# Patient Record
Sex: FEMALE | Race: WHITE | NOT HISPANIC OR LATINO | Employment: UNEMPLOYED | URBAN - METROPOLITAN AREA
[De-identification: names, ages, dates, MRNs, and addresses within clinical notes are randomized per-mention and may not be internally consistent; named-entity substitution may affect disease eponyms.]

---

## 2017-01-11 ENCOUNTER — TELEPHONE (OUTPATIENT)
Dept: INTERNAL MEDICINE | Facility: CLINIC | Age: 42
End: 2017-01-11

## 2017-01-11 NOTE — TELEPHONE ENCOUNTER
----- Message from Eli ALEXANDER MD sent at 1/10/2017  4:54 PM EST -----  Contact: PATIENT  Stay off the medication and continue with healthy diet (lots of vegetables, less processed food)  ----- Message -----     From: Julia ALEXANDER MA     Sent: 1/10/2017   1:29 PM       To: Eli ALEXANDER MD        ----- Message -----     From: Cris Person     Sent: 1/10/2017   1:18 PM       To: Trisha Cleveland MA    PATIENT WAS TAKING METFORMIN FOR HER PCOS ISSUES. METFORMIN WAS MAKING HER SICK WITH DIARRHEA AND HEART RACING ISSUES. SHE TOOK HERSELF OFF THIS MEDICATION AND IS DOING FINE. SHE WANTED TO CHECK WITH YOU TO SEE IF SHE NEEDS TO TRY SOMETHING ELSE OR JUST STAY OFF OF MEDICATION AT THIS POINT SINCE SHE IS DOING OKAY. YOU CAN REACH PATIENT BACK [phone].

## 2017-03-01 ENCOUNTER — OFFICE VISIT (OUTPATIENT)
Dept: ENDOCRINOLOGY | Facility: CLINIC | Age: 42
End: 2017-03-01

## 2017-03-01 VITALS
HEIGHT: 64 IN | DIASTOLIC BLOOD PRESSURE: 74 MMHG | SYSTOLIC BLOOD PRESSURE: 132 MMHG | HEART RATE: 68 BPM | WEIGHT: 293 LBS | OXYGEN SATURATION: 96 % | BODY MASS INDEX: 50.02 KG/M2

## 2017-03-01 DIAGNOSIS — E28.2 PCOS (POLYCYSTIC OVARIAN SYNDROME): ICD-10-CM

## 2017-03-01 DIAGNOSIS — E89.0 POSTABLATIVE HYPOTHYROIDISM: Primary | ICD-10-CM

## 2017-03-01 DIAGNOSIS — E55.9 VITAMIN D DEFICIENCY: ICD-10-CM

## 2017-03-01 LAB
25(OH)D3 SERPL-MCNC: 14.8 NG/ML
ARTICHOKE IGE QN: 119 MG/DL (ref 0–130)
CHOLEST SERPL-MCNC: 184 MG/DL (ref 0–200)
HBA1C MFR BLD: 5.8 % (ref 4.8–5.6)
HDLC SERPL-MCNC: 34 MG/DL (ref 40–60)
T4 FREE SERPL-MCNC: 1.49 NG/DL (ref 0.89–1.76)
TRIGL SERPL-MCNC: 239 MG/DL (ref 0–150)
TSH SERPL DL<=0.05 MIU/L-ACNC: 0.51 MIU/ML (ref 0.35–5.35)

## 2017-03-01 PROCEDURE — 80061 LIPID PANEL: CPT | Performed by: INTERNAL MEDICINE

## 2017-03-01 PROCEDURE — 82306 VITAMIN D 25 HYDROXY: CPT | Performed by: INTERNAL MEDICINE

## 2017-03-01 PROCEDURE — 84439 ASSAY OF FREE THYROXINE: CPT | Performed by: INTERNAL MEDICINE

## 2017-03-01 PROCEDURE — 99213 OFFICE O/P EST LOW 20 MIN: CPT | Performed by: INTERNAL MEDICINE

## 2017-03-01 PROCEDURE — 84443 ASSAY THYROID STIM HORMONE: CPT | Performed by: INTERNAL MEDICINE

## 2017-03-01 PROCEDURE — 83036 HEMOGLOBIN GLYCOSYLATED A1C: CPT | Performed by: INTERNAL MEDICINE

## 2017-04-15 DIAGNOSIS — E89.0 POSTABLATIVE HYPOTHYROIDISM: ICD-10-CM

## 2017-04-17 RX ORDER — LEVOTHYROXINE SODIUM 25 MCG
TABLET ORAL
Qty: 90 TABLET | Refills: 0 | Status: SHIPPED | OUTPATIENT
Start: 2017-04-17 | End: 2017-09-07 | Stop reason: SDUPTHER

## 2017-04-17 RX ORDER — LEVOTHYROXINE SODIUM 200 MCG
TABLET ORAL
Qty: 90 TABLET | Refills: 0 | Status: SHIPPED | OUTPATIENT
Start: 2017-04-17 | End: 2017-07-30 | Stop reason: SDUPTHER

## 2017-04-17 RX ORDER — LEVOTHYROXINE SODIUM 50 MCG
TABLET ORAL
Qty: 90 TABLET | Refills: 0 | Status: SHIPPED | OUTPATIENT
Start: 2017-04-17 | End: 2017-09-07 | Stop reason: SDUPTHER

## 2017-07-19 ENCOUNTER — TRANSCRIBE ORDERS (OUTPATIENT)
Dept: MAMMOGRAPHY | Facility: HOSPITAL | Age: 42
End: 2017-07-19

## 2017-07-19 DIAGNOSIS — Z12.31 VISIT FOR SCREENING MAMMOGRAM: Primary | ICD-10-CM

## 2017-07-25 ENCOUNTER — TELEPHONE (OUTPATIENT)
Dept: INTERNAL MEDICINE | Facility: CLINIC | Age: 42
End: 2017-07-25

## 2017-07-25 NOTE — TELEPHONE ENCOUNTER
Called pt to find out what she specifically needed the referral for per Devorah. Pt stated she is needing to get new hearing aids. I informed pt I would let Devorah know. Pt had no further questions.

## 2017-07-25 NOTE — TELEPHONE ENCOUNTER
MS JARRODSELAM CALLED TODAY REQUESTING A REFERRAL FOR ENT. SHE CALLED DR MERON CALZADA'S OFFICE TO MAKE AN APPT AND WAS TOLD THAT THEY ONLY TAKE NEW PTS BY REFERRAL.    CALL BACK #[phone]

## 2017-07-26 DIAGNOSIS — H91.90 PERCEIVED HEARING CHANGES: Primary | ICD-10-CM

## 2017-07-30 DIAGNOSIS — E89.0 POSTABLATIVE HYPOTHYROIDISM: ICD-10-CM

## 2017-07-31 RX ORDER — LEVOTHYROXINE SODIUM 200 MCG
TABLET ORAL
Qty: 90 TABLET | Refills: 0 | Status: SHIPPED | OUTPATIENT
Start: 2017-07-31 | End: 2017-09-07 | Stop reason: SDUPTHER

## 2017-09-07 ENCOUNTER — OFFICE VISIT (OUTPATIENT)
Dept: ENDOCRINOLOGY | Facility: CLINIC | Age: 42
End: 2017-09-07

## 2017-09-07 VITALS
HEART RATE: 74 BPM | OXYGEN SATURATION: 97 % | SYSTOLIC BLOOD PRESSURE: 136 MMHG | DIASTOLIC BLOOD PRESSURE: 80 MMHG | HEIGHT: 64 IN | WEIGHT: 293 LBS | BODY MASS INDEX: 50.02 KG/M2

## 2017-09-07 DIAGNOSIS — R79.89 ABNORMAL LIVER FUNCTION TEST: ICD-10-CM

## 2017-09-07 DIAGNOSIS — E89.0 POSTABLATIVE HYPOTHYROIDISM: Primary | ICD-10-CM

## 2017-09-07 DIAGNOSIS — E28.2 PCOS (POLYCYSTIC OVARIAN SYNDROME): ICD-10-CM

## 2017-09-07 DIAGNOSIS — E55.9 VITAMIN D DEFICIENCY: ICD-10-CM

## 2017-09-07 LAB
ALBUMIN SERPL-MCNC: 4 G/DL (ref 3.2–4.8)
ALBUMIN/GLOB SERPL: 1.3 G/DL (ref 1.5–2.5)
ALP SERPL-CCNC: 53 U/L (ref 25–100)
ALT SERPL W P-5'-P-CCNC: 23 U/L (ref 7–40)
ANION GAP SERPL CALCULATED.3IONS-SCNC: 7 MMOL/L (ref 3–11)
ARTICHOKE IGE QN: 125 MG/DL (ref 0–130)
AST SERPL-CCNC: 15 U/L (ref 0–33)
BILIRUB SERPL-MCNC: 0.3 MG/DL (ref 0.3–1.2)
BUN BLD-MCNC: 15 MG/DL (ref 9–23)
BUN/CREAT SERPL: 25 (ref 7–25)
CALCIUM SPEC-SCNC: 9.3 MG/DL (ref 8.7–10.4)
CHLORIDE SERPL-SCNC: 103 MMOL/L (ref 99–109)
CHOLEST SERPL-MCNC: 196 MG/DL (ref 0–200)
CO2 SERPL-SCNC: 27 MMOL/L (ref 20–31)
CREAT BLD-MCNC: 0.6 MG/DL (ref 0.6–1.3)
GFR SERPL CREATININE-BSD FRML MDRD: 109 ML/MIN/1.73
GLOBULIN UR ELPH-MCNC: 3 GM/DL
GLUCOSE BLD-MCNC: 111 MG/DL (ref 70–100)
HBA1C MFR BLD: 5.5 % (ref 4.8–5.6)
HDLC SERPL-MCNC: 42 MG/DL (ref 40–60)
POTASSIUM BLD-SCNC: 4.1 MMOL/L (ref 3.5–5.5)
PROT SERPL-MCNC: 7 G/DL (ref 5.7–8.2)
SODIUM BLD-SCNC: 137 MMOL/L (ref 132–146)
T4 FREE SERPL-MCNC: 1.3 NG/DL (ref 0.89–1.76)
TRIGL SERPL-MCNC: 158 MG/DL (ref 0–150)
TSH SERPL DL<=0.05 MIU/L-ACNC: 2.04 MIU/ML (ref 0.35–5.35)

## 2017-09-07 PROCEDURE — 84439 ASSAY OF FREE THYROXINE: CPT | Performed by: INTERNAL MEDICINE

## 2017-09-07 PROCEDURE — 80053 COMPREHEN METABOLIC PANEL: CPT | Performed by: INTERNAL MEDICINE

## 2017-09-07 PROCEDURE — 84443 ASSAY THYROID STIM HORMONE: CPT | Performed by: INTERNAL MEDICINE

## 2017-09-07 PROCEDURE — 83036 HEMOGLOBIN GLYCOSYLATED A1C: CPT | Performed by: INTERNAL MEDICINE

## 2017-09-07 PROCEDURE — 99213 OFFICE O/P EST LOW 20 MIN: CPT | Performed by: INTERNAL MEDICINE

## 2017-09-07 PROCEDURE — 80061 LIPID PANEL: CPT | Performed by: INTERNAL MEDICINE

## 2017-09-07 RX ORDER — LEVOTHYROXINE SODIUM 25 MCG
25 TABLET ORAL DAILY
Qty: 90 TABLET | Refills: 3 | Status: SHIPPED | OUTPATIENT
Start: 2017-09-07 | End: 2017-11-08 | Stop reason: DRUGHIGH

## 2017-09-07 RX ORDER — LEVOTHYROXINE SODIUM 200 MCG
200 TABLET ORAL DAILY
Qty: 90 TABLET | Refills: 3 | Status: SHIPPED | OUTPATIENT
Start: 2017-09-07 | End: 2018-06-11 | Stop reason: SDUPTHER

## 2017-09-07 RX ORDER — LEVOTHYROXINE SODIUM 50 MCG
50 TABLET ORAL DAILY
Qty: 90 TABLET | Refills: 3 | Status: SHIPPED | OUTPATIENT
Start: 2017-09-07 | End: 2018-08-29 | Stop reason: SDUPTHER

## 2017-09-07 NOTE — PROGRESS NOTES
Chief complaint  Hashimoto's Thyroiditis (F/u visit for Hashimoto's and PCOS, no new sx's or concerns. No recent labs) and Polycystic Ovary Syndrome    Subjective   Kanwal Bruner is a 41 y.o. female is here today for follow-up.   PCOS - Clinical features include oligomenorrhea and insulin resistance.   OSM form was causing diarrhea as well and she also had heart palpitations. She stopped medication few months ago.   She started feeling better after stopping metformin. Diarrhea and palpitations resolved.   Symptoms: acne, skin darkening, weight gain of 15 lbs.     Hypothyroidism. The patient has Hashimoto's thyroiditis dx 2004. Current treatment includes levothyroxine (Synthroid)  250 mcg three times a week, 225 mcg 4 times a week. Symptoms: weight gain, heat intolerance, diarrhea and anxiety, sleep disturbance, hair loss, fatigue. The patient is currently experiencing symptoms.      Vit D deficiency  She stopped Vit D supplements because of diarrhea.      Hashimoto's Thyroiditis   Associated symptoms include weakness. Pertinent negatives include no abdominal pain, arthralgias, chest pain, chills, congestion, coughing, diaphoresis, headaches, joint swelling, myalgias, nausea, neck pain, sore throat or vomiting.   Hypothyroidism   Associated symptoms include weakness. Pertinent negatives include no abdominal pain, arthralgias, chest pain, chills, congestion, coughing, diaphoresis, headaches, joint swelling, myalgias, nausea, neck pain, sore throat or vomiting.       Medications    Current Outpatient Prescriptions:   •  EPIPEN 2-OSWALDO 0.3 MG/0.3ML solution auto-injector injection, INJECT INTO LATERAL THIGH FOR SEVERE ALLERGIC REACTION. CALL 911 AFTER USING THIS MEDICATION, Disp: , Rfl: 2  •  SYNTHROID 200 MCG tablet, TAKE 1 TABLET DAILY AS DIRECTED (225 MCG  MCG DAILY ALTERNATING DOSES), Disp: 90 tablet, Rfl: 0  •  SYNTHROID 25 MCG tablet, TAKE 1 TABLET DAILY AS DIRECTED (225 MCG  MCG DAILY  "ALTERNATING DOSES), Disp: 90 tablet, Rfl: 0  •  SYNTHROID 50 MCG tablet, TAKE 1 TABLET DAILY, Disp: 90 tablet, Rfl: 0    PMH  The following portions of the patient's history were reviewed and updated as appropriate: allergies, current medications, past family history, past medical history, past social history, past surgical history and problem list.    Review of systems  Review of Systems   Constitutional: Positive for unexpected weight change (weight gain). Negative for activity change, appetite change, chills and diaphoresis.   HENT: Negative for congestion, ear pain, facial swelling, hearing loss, postnasal drip, sore throat, trouble swallowing and voice change.    Eyes: Negative.  Negative for redness, itching and visual disturbance.   Respiratory: Negative for cough and shortness of breath.    Cardiovascular: Negative for chest pain, palpitations and leg swelling.   Gastrointestinal: Negative for abdominal distention, abdominal pain, constipation, diarrhea, nausea and vomiting.   Endocrine:        As listed in HPI   Genitourinary: Negative.    Musculoskeletal: Negative for arthralgias, back pain, joint swelling, myalgias, neck pain and neck stiffness.   Skin: Negative.    Allergic/Immunologic: Negative.    Neurological: Positive for weakness. Negative for dizziness, tremors, syncope, light-headedness and headaches.   Hematological: Negative.    Psychiatric/Behavioral: Negative.    All other systems reviewed and are negative.      Physical exam  Objective   Blood pressure 136/80, pulse 74, height 63.5\" (161.3 cm), weight (!) 332 lb 3.2 oz (151 kg), SpO2 97 %.   Physical Exam   Constitutional: She is oriented to person, place, and time. She appears well-developed and well-nourished.   HENT:   Head: Normocephalic and atraumatic.   Eyes: Conjunctivae are normal.   Neck: No thyromegaly present.   The neck is supple and symmetric   Cardiovascular: Normal rate, regular rhythm and normal heart sounds.  "   Pulmonary/Chest: Effort normal and breath sounds normal.   Musculoskeletal: She exhibits no edema.   Lymphadenopathy:     She has no cervical adenopathy.   Neurological: She is alert and oriented to person, place, and time.   Skin: Skin is warm and dry. No rash noted.   Psychiatric: She has a normal mood and affect. Thought content normal.   Vitals reviewed.        Assessment  Assessment/Plan   Problem List Items Addressed This Visit        Digestive    Vitamin D deficiency       Endocrine    Postablative hypothyroidism - Primary    PCOS (polycystic ovarian syndrome)       Other    Abnormal liver function test          Plan  -Synthroid 250 mcg and 225 mcg alternating doses. Repeat labs today to adjust thyroid levels.   labs ordered for dose adjusted     Followup in 4-6 months.

## 2017-09-08 ENCOUNTER — HOSPITAL ENCOUNTER (OUTPATIENT)
Dept: MAMMOGRAPHY | Facility: HOSPITAL | Age: 42
Discharge: HOME | End: 2017-09-08
Attending: OBSTETRICS & GYNECOLOGY | Admitting: OBSTETRICS & GYNECOLOGY

## 2017-09-08 DIAGNOSIS — Z12.31 VISIT FOR SCREENING MAMMOGRAM: ICD-10-CM

## 2017-09-08 PROCEDURE — 77067 SCR MAMMO BI INCL CAD: CPT | Performed by: RADIOLOGY

## 2017-09-08 PROCEDURE — G0202 SCR MAMMO BI INCL CAD: HCPCS

## 2017-09-08 PROCEDURE — 77063 BREAST TOMOSYNTHESIS BI: CPT

## 2017-09-08 PROCEDURE — 77063 BREAST TOMOSYNTHESIS BI: CPT | Performed by: RADIOLOGY

## 2017-11-08 ENCOUNTER — OFFICE VISIT (OUTPATIENT)
Dept: INTERNAL MEDICINE | Facility: CLINIC | Age: 42
End: 2017-11-08

## 2017-11-08 VITALS
HEIGHT: 64 IN | SYSTOLIC BLOOD PRESSURE: 130 MMHG | WEIGHT: 293 LBS | OXYGEN SATURATION: 99 % | DIASTOLIC BLOOD PRESSURE: 86 MMHG | BODY MASS INDEX: 50.02 KG/M2 | HEART RATE: 69 BPM | TEMPERATURE: 97.5 F

## 2017-11-08 DIAGNOSIS — R03.0 TEMPORARY HIGH BLOOD PRESSURE: ICD-10-CM

## 2017-11-08 DIAGNOSIS — R10.9 BILATERAL FLANK PAIN: Primary | ICD-10-CM

## 2017-11-08 DIAGNOSIS — R31.29 MICROSCOPIC HEMATURIA: ICD-10-CM

## 2017-11-08 LAB
BILIRUB BLD-MCNC: NEGATIVE MG/DL
CLARITY, POC: ABNORMAL
COLOR UR: YELLOW
GLUCOSE UR STRIP-MCNC: NEGATIVE MG/DL
KETONES UR QL: NEGATIVE
LEUKOCYTE EST, POC: NEGATIVE
NITRITE UR-MCNC: NEGATIVE MG/ML
PH UR: 6 [PH] (ref 5–8)
PROT UR STRIP-MCNC: NEGATIVE MG/DL
RBC # UR STRIP: ABNORMAL /UL
SP GR UR: 1.01 (ref 1–1.03)
UROBILINOGEN UR QL: NORMAL

## 2017-11-08 PROCEDURE — 87086 URINE CULTURE/COLONY COUNT: CPT | Performed by: NURSE PRACTITIONER

## 2017-11-08 PROCEDURE — 81003 URINALYSIS AUTO W/O SCOPE: CPT | Performed by: NURSE PRACTITIONER

## 2017-11-08 PROCEDURE — 99213 OFFICE O/P EST LOW 20 MIN: CPT | Performed by: NURSE PRACTITIONER

## 2017-11-08 RX ORDER — HYDROXYZINE PAMOATE 25 MG/1
CAPSULE ORAL
Refills: 10 | COMMUNITY
Start: 2017-09-19 | End: 2018-05-29

## 2017-11-08 RX ORDER — EPINEPHRINE 0.3 MG/.3ML
0.3 INJECTION INTRAMUSCULAR ONCE
Qty: 1 EACH | Refills: 2 | Status: SHIPPED | OUTPATIENT
Start: 2017-11-08 | End: 2017-11-08

## 2017-11-08 NOTE — PROGRESS NOTES
"Subjective  Back Pain (x 2 weeks) and Difficulty Urinating (painful)      Kanwal Bruner is a 42 y.o. female.   Allergies   Allergen Reactions   • Peanuts [Peanut Oil]    • Penicillins    • Sulfa Antibiotics      History of Present Illness      10-14 days ago had sp cramping and then went away, last night sp pain started again yesterday with peter flank pain , no fever/chills , no otc meds ,   Pt states she was rushed in traffic this morning has already had soda , had hard time getting her kids up and ready she thinks this reason her b/p up  The following portions of the patient's history were reviewed and updated as appropriate: allergies, past family history, past social history and problem list.    Review of Systems   Constitutional: Negative for chills and fever.   Gastrointestinal: Positive for abdominal pain.   Genitourinary: Positive for flank pain.   All other systems reviewed and are negative.      Objective   Physical Exam   Constitutional: She is oriented to person, place, and time. She appears well-developed and well-nourished.   HENT:   Head: Normocephalic and atraumatic.   Eyes: Conjunctivae are normal.   Cardiovascular: Normal rate and regular rhythm.    Pulmonary/Chest: Effort normal and breath sounds normal.   Abdominal: Soft. Bowel sounds are normal. She exhibits no distension and no mass. There is no hepatosplenomegaly. There is no tenderness. There is no rebound and no CVA tenderness.   Neurological: She is oriented to person, place, and time.   Skin: Skin is warm and dry.   Psychiatric: She has a normal mood and affect. Her behavior is normal. Thought content normal.   Nursing note and vitals reviewed.    /86  Pulse 69  Temp 97.5 °F (36.4 °C)  Ht 63.5\" (161.3 cm)  Wt (!) 331 lb 12.8 oz (151 kg)  SpO2 99%  BMI 57.85 kg/m2    Assessment/Plan     Problem List Items Addressed This Visit        Cardiovascular and Mediastinum    Temporary high blood pressure     .pt states no hx of high " b/p, will recheck 2 weeks         Relevant Medications    EPIPEN 2-OSWALDO 0.3 MG/0.3ML solution auto-injector injection       Nervous and Auditory    Bilateral flank pain - Primary     U/a shows trace heme, pt to take AZO otc and increase water intake , if worsens in anyway she is to call me immed          Relevant Orders    POCT urinalysis dipstick, automated (Completed)       Genitourinary    Microscopic hematuria     Will send for cx , if neg will repeat next visit         Relevant Orders    Urine Culture - Urine, Urine, Clean Catch        Results for orders placed or performed in visit on 11/08/17   POCT urinalysis dipstick, automated   Result Value Ref Range    Color Yellow Yellow, Straw, Dark Yellow, Aysha    Clarity, UA Cloudy (A) Clear    Glucose, UA Negative Negative, 1000 mg/dL (3+) mg/dL    Bilirubin Negative Negative    Ketones, UA Negative Negative    Specific Gravity  1.015 1.005 - 1.030    Blood, UA Trace (A) Negative    pH, Urine 6.0 5.0 - 8.0    Protein, POC Negative Negative mg/dL    Urobilinogen, UA Normal Normal    Leukocytes Negative Negative    Nitrite, UA Negative Negative

## 2017-11-08 NOTE — ASSESSMENT & PLAN NOTE
U/a shows trace heme, pt to take AZO otc and increase water intake , if worsens in anyway she is to call me immed

## 2017-11-10 LAB — BACTERIA SPEC AEROBE CULT: NORMAL

## 2017-12-07 ENCOUNTER — OFFICE VISIT (OUTPATIENT)
Dept: INTERNAL MEDICINE | Facility: CLINIC | Age: 42
End: 2017-12-07

## 2017-12-07 VITALS
OXYGEN SATURATION: 99 % | BODY MASS INDEX: 58.41 KG/M2 | WEIGHT: 293 LBS | SYSTOLIC BLOOD PRESSURE: 138 MMHG | DIASTOLIC BLOOD PRESSURE: 76 MMHG | HEART RATE: 80 BPM

## 2017-12-07 DIAGNOSIS — E66.01 MORBID OBESITY (HCC): ICD-10-CM

## 2017-12-07 DIAGNOSIS — I10 HYPERTENSION, UNSPECIFIED TYPE: ICD-10-CM

## 2017-12-07 DIAGNOSIS — R31.29 MICROSCOPIC HEMATURIA: Primary | ICD-10-CM

## 2017-12-07 LAB
BILIRUB BLD-MCNC: NEGATIVE MG/DL
CLARITY, POC: CLEAR
COLOR UR: YELLOW
GLUCOSE UR STRIP-MCNC: NEGATIVE MG/DL
KETONES UR QL: NEGATIVE
LEUKOCYTE EST, POC: NEGATIVE
NITRITE UR-MCNC: NEGATIVE MG/ML
PH UR: 6.5 [PH] (ref 5–8)
PROT UR STRIP-MCNC: NEGATIVE MG/DL
RBC # UR STRIP: NEGATIVE /UL
SP GR UR: 1.01 (ref 1–1.03)
UROBILINOGEN UR QL: NORMAL

## 2017-12-07 PROCEDURE — 81003 URINALYSIS AUTO W/O SCOPE: CPT | Performed by: NURSE PRACTITIONER

## 2017-12-07 PROCEDURE — 99214 OFFICE O/P EST MOD 30 MIN: CPT | Performed by: NURSE PRACTITIONER

## 2017-12-07 NOTE — PROGRESS NOTES
Subjective  Follow-up (elevated blood pressure, blood in urine)      Kanwal Bruner is a 42 y.o. female.   Allergies   Allergen Reactions   • Peanuts [Peanut Oil]    • Penicillins    • Sulfa Antibiotics      History of Present Illness      Is trying to exercise more than usual , has been walking more than usual , has cut down on coke drinking , drinking 1 can a day , the rest is tea or water   No gross heme  The following portions of the patient's history were reviewed and updated as appropriate: allergies, past medical history, past surgical history and problem list.    Review of Systems   Constitutional:        Morbid obesity  \   HENT: Negative.    Respiratory: Negative.    Cardiovascular: Negative.    All other systems reviewed and are negative.      Objective   Physical Exam   Constitutional: She is oriented to person, place, and time. She appears well-developed and well-nourished.   Morbid obesity   HENT:   Head: Normocephalic and atraumatic.   Eyes: Conjunctivae are normal.   Neck: No thyromegaly present.   Cardiovascular: Normal rate and regular rhythm.    Pulmonary/Chest: Effort normal and breath sounds normal.   Lymphadenopathy:     She has no cervical adenopathy.   Neurological: She is alert and oriented to person, place, and time.   Skin: Skin is warm and dry.   Psychiatric: She has a normal mood and affect. Her behavior is normal. Judgment and thought content normal.   Nursing note and vitals reviewed.    /76  Pulse 80  Wt (!) 152 kg (335 lb)  SpO2 99%  BMI 58.41 kg/m2    Assessment/Plan     Problem List Items Addressed This Visit        Cardiovascular and Mediastinum    Hypertension     Hypertension is improving.  Continue current treatment regimen.  Dietary sodium restriction.  Weight loss.  Regular aerobic exercise.  Blood pressure will be reassessed at the next regular appointment.            Digestive    Morbid obesity     Increase exercise, decrease fried fatty foods  And soda              Genitourinary    Microscopic hematuria - Primary    Relevant Orders    POCT urinalysis dipstick, automated (Completed)        Results for orders placed or performed in visit on 12/07/17   POCT urinalysis dipstick, automated   Result Value Ref Range    Color Yellow Yellow, Straw, Dark Yellow, Aysha    Clarity, UA Clear Clear    Glucose, UA Negative Negative, 1000 mg/dL (3+) mg/dL    Bilirubin Negative Negative    Ketones, UA Negative Negative    Specific Gravity  1.010 1.005 - 1.030    Blood, UA Negative Negative    pH, Urine 6.5 5.0 - 8.0    Protein, POC Negative Negative mg/dL    Urobilinogen, UA Normal Normal    Leukocytes Negative Negative    Nitrite, UA Negative Negative

## 2017-12-07 NOTE — ASSESSMENT & PLAN NOTE
Hypertension is improving.  Continue current treatment regimen.  Dietary sodium restriction.  Weight loss.  Regular aerobic exercise.  Blood pressure will be reassessed at the next regular appointment.

## 2017-12-15 ENCOUNTER — TELEPHONE (OUTPATIENT)
Dept: INTERNAL MEDICINE | Facility: CLINIC | Age: 42
End: 2017-12-15

## 2017-12-18 RX ORDER — EPINEPHRINE 0.3 MG/.3ML
INJECTION SUBCUTANEOUS
Qty: 2 EACH | Refills: 11 | Status: SHIPPED | OUTPATIENT
Start: 2017-12-18 | End: 2019-09-17 | Stop reason: SDUPTHER

## 2018-03-28 ENCOUNTER — OFFICE VISIT (OUTPATIENT)
Dept: INTERNAL MEDICINE | Facility: CLINIC | Age: 43
End: 2018-03-28

## 2018-03-28 ENCOUNTER — HOSPITAL ENCOUNTER (OUTPATIENT)
Dept: GENERAL RADIOLOGY | Facility: HOSPITAL | Age: 43
Discharge: HOME | End: 2018-03-28
Admitting: NURSE PRACTITIONER

## 2018-03-28 VITALS
DIASTOLIC BLOOD PRESSURE: 78 MMHG | OXYGEN SATURATION: 98 % | HEART RATE: 72 BPM | RESPIRATION RATE: 18 BRPM | SYSTOLIC BLOOD PRESSURE: 126 MMHG | WEIGHT: 293 LBS | HEIGHT: 63 IN | BODY MASS INDEX: 51.91 KG/M2

## 2018-03-28 DIAGNOSIS — G89.29 CHRONIC PAIN OF RIGHT KNEE: Primary | ICD-10-CM

## 2018-03-28 DIAGNOSIS — G89.29 CHRONIC PAIN OF RIGHT KNEE: ICD-10-CM

## 2018-03-28 DIAGNOSIS — M25.561 CHRONIC PAIN OF RIGHT KNEE: ICD-10-CM

## 2018-03-28 DIAGNOSIS — M25.561 CHRONIC PAIN OF RIGHT KNEE: Primary | ICD-10-CM

## 2018-03-28 PROCEDURE — 99213 OFFICE O/P EST LOW 20 MIN: CPT | Performed by: NURSE PRACTITIONER

## 2018-03-28 PROCEDURE — 73560 X-RAY EXAM OF KNEE 1 OR 2: CPT

## 2018-03-28 RX ORDER — PREDNISONE 20 MG/1
TABLET ORAL
Qty: 19 TABLET | Refills: 0 | Status: SHIPPED | OUTPATIENT
Start: 2018-03-28 | End: 2018-04-18

## 2018-03-28 NOTE — PROGRESS NOTES
"Subjective  Knee Pain (R knee; may have hyper-extended. Ongoing pain thats been on and off. Pain has gotten worse over the last month. )      Kanwal Bruner is a 42 y.o. female.   Allergies   Allergen Reactions   • Peanuts [Peanut Oil]    • Sulfa Antibiotics      History of Present Illness      Right knee pain, thinks she hyperextended, was intermittent, sometimes will pop then she will be pain free, last couple of months worsened , worse with walking, sometimes 9/10 on pain scale , motrin no longer helping , started 1 year ago initially , has trouble straightining  The following portions of the patient's history were reviewed and updated as appropriate: allergies, current medications, past surgical history and problem list.    Review of Systems   Musculoskeletal: Positive for arthralgias.   All other systems reviewed and are negative.      Objective   Physical Exam   Constitutional: She is oriented to person, place, and time. She appears well-developed and well-nourished.   HENT:   Head: Normocephalic and atraumatic.   Eyes: Conjunctivae are normal.   Cardiovascular: Normal rate.    Pulmonary/Chest: Effort normal and breath sounds normal.   Musculoskeletal: She exhibits edema.        Right knee: She exhibits swelling (mild).   Neurological: She is alert and oriented to person, place, and time.   Skin: Skin is warm and dry.   Psychiatric: She has a normal mood and affect. Her behavior is normal. Judgment and thought content normal.   Nursing note and vitals reviewed.    /78   Pulse 72   Resp 18   Ht 160 cm (63\")   Wt (!) 151 kg (333 lb)   SpO2 98%   BMI 58.99 kg/m²     Assessment/Plan     Problem List Items Addressed This Visit     None      Visit Diagnoses     Chronic pain of right knee    -  Primary    Relevant Medications    predniSONE (DELTASONE) 20 MG tablet    Other Relevant Orders    XR Knee 1 or 2 View Right          I have given pt order for PT, I will call her with xray results     "

## 2018-04-18 ENCOUNTER — HOSPITAL ENCOUNTER (EMERGENCY)
Facility: HOSPITAL | Age: 43
Discharge: HOME | End: 2018-04-18
Attending: EMERGENCY MEDICINE | Admitting: EMERGENCY MEDICINE

## 2018-04-18 ENCOUNTER — APPOINTMENT (OUTPATIENT)
Dept: MRI IMAGING | Facility: HOSPITAL | Age: 43
End: 2018-04-18

## 2018-04-18 VITALS
OXYGEN SATURATION: 96 % | BODY MASS INDEX: 50.02 KG/M2 | TEMPERATURE: 98.4 F | HEIGHT: 64 IN | DIASTOLIC BLOOD PRESSURE: 78 MMHG | HEART RATE: 81 BPM | RESPIRATION RATE: 16 BRPM | SYSTOLIC BLOOD PRESSURE: 144 MMHG | WEIGHT: 293 LBS

## 2018-04-18 DIAGNOSIS — G51.0 BELL'S PALSY: Primary | ICD-10-CM

## 2018-04-18 PROCEDURE — 70551 MRI BRAIN STEM W/O DYE: CPT

## 2018-04-18 PROCEDURE — 99283 EMERGENCY DEPT VISIT LOW MDM: CPT

## 2018-04-18 RX ORDER — ACYCLOVIR 400 MG/1
400 TABLET ORAL
Qty: 35 TABLET | Refills: 0 | Status: SHIPPED | OUTPATIENT
Start: 2018-04-18 | End: 2018-04-26

## 2018-04-18 RX ORDER — MINERAL OIL, PETROLATUM 425; 568 MG/G; MG/G
OINTMENT OPHTHALMIC AS NEEDED
Qty: 3.5 G | Refills: 0 | Status: SHIPPED | OUTPATIENT
Start: 2018-04-18 | End: 2018-05-29

## 2018-04-18 RX ORDER — PREDNISONE 10 MG/1
TABLET ORAL
Qty: 21 TABLET | Refills: 0 | Status: SHIPPED | OUTPATIENT
Start: 2018-04-18 | End: 2018-04-26

## 2018-04-18 NOTE — DISCHARGE INSTRUCTIONS
Meds as prescribed.  Apply occular lubricant to right eye as needed during the day and then just before going to bed.  Tape right eye closed with paper tape at night.  Return to ER if any concerns.  Call Dr. Schmitt for follow up in office.

## 2018-04-18 NOTE — ED PROVIDER NOTES
Subjective   41 yo female presents to ER with complaints of right facial droop.  The pt states that yesterday, she noticed some tingling to the right side of her tongue.  This morning, on awakening, she felt like her right eye was not closing fully when she blinked.  She looked in the mirror and noticed that the right side of her face drooped a bit and she noticed that when she puckered her lips, the right side did not pucker.  No facial numbness.  No vision changes.  No weakness or numbness in the extremities.  The pt notes that she has had a recent cold with head congestion and mild cough.  No fever.    Past medical hx of hypothyroidism.  She is a non-smoker.        History provided by:  Patient      Review of Systems   Constitutional: Negative for chills and fever.   HENT: Positive for congestion. Negative for ear pain, nosebleeds, sinus pain, sore throat and trouble swallowing.    Eyes: Negative for pain and visual disturbance.   Respiratory: Positive for cough (mild, non-productive). Negative for shortness of breath.    Cardiovascular: Negative for chest pain.   Gastrointestinal: Negative for abdominal pain, nausea and vomiting.   Endocrine: Negative for polydipsia, polyphagia and polyuria.   Genitourinary: Negative for dysuria.   Musculoskeletal: Negative for arthralgias and neck pain.   Skin: Negative for pallor.   Allergic/Immunologic: Negative for immunocompromised state.   Neurological: Positive for facial asymmetry. Negative for dizziness, speech difficulty, light-headedness, numbness and headaches.   Hematological: Negative for adenopathy. Does not bruise/bleed easily.   Psychiatric/Behavioral: Negative for confusion.       Past Medical History:   Diagnosis Date   • History of gallstones    • History of ovarian cyst    • Thyroid disease        Allergies   Allergen Reactions   • Peanuts [Peanut Oil]    • Sulfa Antibiotics        Past Surgical History:   Procedure Laterality Date   • ADENOIDECTOMY     •  BREAST BIOPSY Right 2013    Ultrasound guided   •  SECTION     • CHOLECYSTECTOMY     • HYSTERECTOMY  2015    left ovary left       Family History   Problem Relation Age of Onset   • Migraines Mother    • Hyperlipidemia Father    • Obesity Father    • Cancer Father      prostate   • Heart attack Maternal Grandmother    • Migraines Maternal Grandmother    • Stroke Paternal Grandfather    • Hypertension Other    • Thyroid disease Other    • Thyroid disease Cousin    • Breast cancer Maternal Aunt 55       Social History     Social History   • Marital status:      Social History Main Topics   • Smoking status: Never Smoker   • Smokeless tobacco: Never Used   • Alcohol use No   • Drug use: Unknown     Other Topics Concern   • Not on file           Objective   Physical Exam   Constitutional: She is oriented to person, place, and time. She appears well-developed and well-nourished. No distress.   HENT:   Head: Normocephalic.   Nose: Nose normal.   Mouth/Throat: Oropharynx is clear and moist.   Eyes: Conjunctivae and EOM are normal. Pupils are equal, round, and reactive to light.   Neck: Normal range of motion. Neck supple.   Cardiovascular: Normal rate, regular rhythm, normal heart sounds and intact distal pulses.    Pulmonary/Chest: Effort normal and breath sounds normal.   Abdominal: Soft. Bowel sounds are normal.   Musculoskeletal: Normal range of motion.   Neurological: She is alert and oriented to person, place, and time. She has normal reflexes.   Mild right facial droop.  Incomplete eyelid closure on right compared to left.  The right brow does raise, but not quite as fully as the left.  Nml tongue protrusion.  No visual field deficits.  Equal  bilaterally.  No drift.  Nml finger to nose.  Nml heel to shin rub.  Nml mentation.   Skin: Skin is warm and dry.   Psychiatric: She has a normal mood and affect.       Procedures         ED Course  ED Course    9:53 AM  The patient's symptoms are  "consistent with a Bell's palsy.  MRI of the brain was normal.  She does have some sphenoid sinusitis.  I will discharge her home on steroids, antivirals and an ocular lubricant and have her follow-up with neurology.  No results found for this or any previous visit (from the past 24 hour(s)).  Note: In addition to lab results from this visit, the labs listed above may include labs taken at another facility or during a different encounter within the last 24 hours. Please correlate lab times with ED admission and discharge times for further clarification of the services performed during this visit.    MRI Brain Without Contrast    (Results Pending)     Vitals:    04/18/18 0747   BP: 148/73   Pulse: 89   Resp: 16   Temp: 98.4 °F (36.9 °C)   TempSrc: Oral   SpO2: 97%   Weight: (!) 146 kg (321 lb)   Height: 161.3 cm (63.5\")     Medications - No data to display  ECG/EMG Results (last 24 hours)     ** No results found for the last 24 hours. **                      [facility]    Final diagnoses:   Bell's palsy            ABEL Dejesus  04/18/18 0953    "

## 2018-04-25 ENCOUNTER — TELEPHONE (OUTPATIENT)
Dept: INTERNAL MEDICINE | Facility: CLINIC | Age: 43
End: 2018-04-25

## 2018-04-25 NOTE — TELEPHONE ENCOUNTER
PATIENT WENT TO THE ER LAST [facility]ED[facility] AND WAS DIAGNOSE WITH KNOTT PALSY AND SHE STATES THAT SHE HAS A LUMP ON THE RIGHT SIDE OF HER JAW AND WOULD LIKE TO KNOW IF SHE SHOULD COME IN TO HAVE THAT CHECKED OUT? THE PATIENT WOULD LIKE TO GET A CALL BACK [phone].

## 2018-04-26 ENCOUNTER — OFFICE VISIT (OUTPATIENT)
Dept: INTERNAL MEDICINE | Facility: CLINIC | Age: 43
End: 2018-04-26

## 2018-04-26 VITALS
WEIGHT: 293 LBS | RESPIRATION RATE: 18 BRPM | BODY MASS INDEX: 56.71 KG/M2 | TEMPERATURE: 98.4 F | SYSTOLIC BLOOD PRESSURE: 126 MMHG | HEART RATE: 72 BPM | DIASTOLIC BLOOD PRESSURE: 78 MMHG

## 2018-04-26 DIAGNOSIS — J06.9 ACUTE URI: ICD-10-CM

## 2018-04-26 DIAGNOSIS — G51.0 BELL'S PALSY: Primary | ICD-10-CM

## 2018-04-26 PROCEDURE — 99213 OFFICE O/P EST LOW 20 MIN: CPT | Performed by: NURSE PRACTITIONER

## 2018-04-26 NOTE — PROGRESS NOTES
"Subjective   Kanwal Bruner is a 42 y.o. female.   Chief Complaint   Patient presents with   • Swollen Glands     right      History of Present Illness .  I have reviewed the emergency department records  Seen in ED last week for [place] Palsy-treated with steroids, antivirals.  Tuesday, noted a swollen node to right jaw line.  Denies fever or chills.  No HA. Still having lid lag to right eye.  He is using eyedrops and chills along with covering of at night.  She has been in contact with her optometrist.  Has runny nose.  No ear pain, sore throat.  Has mild cough.  No SOA, CP.  Had a \"cold\" 2 weeks ago.  She is scheduled to see neurology tomorrow for her Bell's palsy.    The following portions of the patient's history were reviewed and updated as appropriate: allergies, current medications, past family history, past medical history, past social history, past surgical history and problem list.    Current Outpatient Prescriptions:   •  artificial tears (REFRESH LACRI-LUBE) ointment ophthalmic ointment, Administer  to the right eye As Needed (dry eye). Also, apply at bedtime and tape right eye shut with paper tape., Disp: 3.5 g, Rfl: 0  •  EPINEPHrine (EPIPEN) 0.3 MG/0.3ML solution auto-injector injection, Inject 0.3 ML into the shoulder, thigh, or buttocks for 1 dose., Disp: 2 each, Rfl: 11  •  hydrOXYzine (VISTARIL) 25 MG capsule, TK 1 C PO TID PRA, Disp: , Rfl: 10  •  SYNTHROID 200 MCG tablet, Take 1 tablet by mouth Daily., Disp: 90 tablet, Rfl: 3  •  SYNTHROID 50 MCG tablet, Take 1 tablet by mouth Daily., Disp: 90 tablet, Rfl: 3    Review of Systems   Constitutional:        See history of present illness   HENT:        See history of present illness   Eyes:        See history of present illness   Respiratory: Positive for cough. Negative for shortness of breath.    Cardiovascular: Negative for chest pain.   Gastrointestinal: Negative for abdominal pain, diarrhea, nausea and vomiting.   Endocrine: Negative.  "   Genitourinary: Negative for difficulty urinating.   Musculoskeletal: Negative for arthralgias, myalgias, neck pain and neck stiffness.   Skin: Negative.    Neurological: Negative for light-headedness and headaches.   Hematological: Negative.      /78 (BP Location: Right arm, Patient Position: Sitting)   Pulse 72   Temp 98.4 °F (36.9 °C) (Oral)   Resp 18   Wt (!) 148 kg (325 lb 4 oz)   BMI 56.71 kg/m²     Objective   Allergies   Allergen Reactions   • Peanuts [Peanut Oil]    • Sulfa Antibiotics        Physical Exam   Constitutional: She is oriented to person, place, and time. She appears well-developed and well-nourished. No distress.   HENT:   Head: Normocephalic.   Right Ear: External ear normal.   Left Ear: External ear normal.   Nose: Nose normal.   Mouth/Throat: Oropharynx is clear and moist.   Nontender over sinuses   Eyes: Right eye exhibits no discharge. Left eye exhibits no discharge.   Lid lag right eye   Neck:   Short squat supple.  Has submandibular node right side is slightly tender   Cardiovascular: Normal rate, regular rhythm, normal heart sounds and intact distal pulses.  Exam reveals no friction rub.    No murmur heard.  Pulmonary/Chest: Effort normal and breath sounds normal. No respiratory distress. She has no wheezes. She has no rales.   Neurological: She is alert and oriented to person, place, and time.   Skin: Skin is warm and dry. Capillary refill takes less than 2 seconds.   Pink no rash   Nursing note and vitals reviewed.      Procedures    Assessment/Plan   Kanwal was seen today for swollen glands.    Diagnoses and all orders for this visit:    Bell's palsy    Acute URI          Patient Instructions   Cool Mist Vaporizer  A cool mist vaporizer is a device that releases a cool mist into the air. If you have a cough or a cold, using a vaporizer may help relieve your symptoms. The mist adds moisture to the air, which may help thin your mucus and make it less sticky. When your  mucus is thin and less sticky, it easier for you to breathe and to cough up secretions.  Do not use a vaporizer if you are allergic to mold.  Follow these instructions at home:  · Follow the instructions that come with the vaporizer.  · Do not use anything other than distilled water in the vaporizer.  · Do not run the vaporizer all of the time. Doing that can cause mold or bacteria to grow in the vaporizer.  · Clean the vaporizer after each time that you use it.  · Clean and dry the vaporizer well before storing it.  · Stop using the vaporizer if your breathing symptoms get worse.  This information is not intended to replace advice given to you by your health care provider. Make sure you discuss any questions you have with your health care provider.  Document Released: 09/14/2005 Document Revised: 07/07/2017 Document Reviewed: 03/18/2017  [employer] Interactive Patient Education © 2017 [employer]  Bell Palsy  Bell palsy is a condition in which the muscles on one side of the face become paralyzed. This often causes one side of the face to droop. It is a common condition and most people recover completely.  RISK FACTORS  Risk factors for Bell palsy include:  · Pregnancy.  · Diabetes.  · An infection by a virus, such as infections that cause cold sores.  CAUSES   Bell palsy is caused by damage to or inflammation of a nerve in your face. It is unclear why this happens, but an infection by a virus may lead to it. Most of the time the reason it happens is unknown.  SIGNS AND SYMPTOMS   Symptoms can range from mild to severe and can take place over a number of hours. Symptoms may include:  · Being unable to:  ¨ Raise one or both eyebrows.  ¨ Close one or both eyes.  ¨ Feel parts of your face (facial numbness).  · Drooping of the eyelid and corner of the mouth.  · Weakness in the face.  · Paralysis of half your face.  · Loss of taste.  · Sensitivity to loud noises.  · Difficulty chewing.  · Tearing up of the affected  eye.  · Dryness in the affected eye.  · Drooling.  · Pain behind one ear.  DIAGNOSIS   Diagnosis of Bell palsy may include:  · A medical history and physical exam.  · An MRI.  · A CT scan.  · Electromyography (EMG). This is a test that checks how your nerves are working.  TREATMENT   Treatment may include antiviral medicine to help shorten the length of the condition. Sometimes treatment is not needed and the symptoms go away on their own.  HOME CARE INSTRUCTIONS   · Take medicines only as directed by your health care provider.  · Do facial massages and exercises as directed by your health care provider.  · If your eye is affected:  ¨ Use moisturizing eye drops to prevent drying of your eye as directed by your health care provider.  ¨ Protect your eye as directed by your health care provider.  SEEK MEDICAL CARE IF:  · Your symptoms do not get better or get worse.  · You are drooling.  · Your eye is red, irritated, or hurts.  SEEK IMMEDIATE MEDICAL CARE IF:   · Another part of your body feels weak or numb.  · You have difficulty swallowing.  · You have a fever along with symptoms of Bell palsy.  · You develop neck pain.  MAKE SURE YOU:   · Understand these instructions.  · Will watch your condition.  · Will get help right away if you are not doing well or get worse.  This information is not intended to replace advice given to you by your health care provider. Make sure you discuss any questions you have with your health care provider.  Document Released: 12/18/2006 Document Revised: 09/07/2016 Document Reviewed: 03/27/2015  [employer] Interactive Patient Education © 2017 [employer]    Use Sudafed PE to help drain fluid from the ears.  Avoid antihistamines due to drying effect on the eyes.  Return to the clinic if not improving.  Keep your follow-up appointment with the neurologist      AMARA Arias

## 2018-04-26 NOTE — TELEPHONE ENCOUNTER
Called and informed pt, offered appt with Devorah this afternoon, pt requested morning appt, scheduled pt with Elisabeth Mejia at 11 am today.

## 2018-04-26 NOTE — PATIENT INSTRUCTIONS
Cool Mist Vaporizer  A cool mist vaporizer is a device that releases a cool mist into the air. If you have a cough or a cold, using a vaporizer may help relieve your symptoms. The mist adds moisture to the air, which may help thin your mucus and make it less sticky. When your mucus is thin and less sticky, it easier for you to breathe and to cough up secretions.  Do not use a vaporizer if you are allergic to mold.  Follow these instructions at home:  · Follow the instructions that come with the vaporizer.  · Do not use anything other than distilled water in the vaporizer.  · Do not run the vaporizer all of the time. Doing that can cause mold or bacteria to grow in the vaporizer.  · Clean the vaporizer after each time that you use it.  · Clean and dry the vaporizer well before storing it.  · Stop using the vaporizer if your breathing symptoms get worse.  This information is not intended to replace advice given to you by your health care provider. Make sure you discuss any questions you have with your health care provider.  Document Released: 09/14/2005 Document Revised: 07/07/2017 Document Reviewed: 03/18/2017  [employer] Interactive Patient Education © 2017 [employer]  Bell Palsy  Bell palsy is a condition in which the muscles on one side of the face become paralyzed. This often causes one side of the face to droop. It is a common condition and most people recover completely.  RISK FACTORS  Risk factors for Bell palsy include:  · Pregnancy.  · Diabetes.  · An infection by a virus, such as infections that cause cold sores.  CAUSES   Bell palsy is caused by damage to or inflammation of a nerve in your face. It is unclear why this happens, but an infection by a virus may lead to it. Most of the time the reason it happens is unknown.  SIGNS AND SYMPTOMS   Symptoms can range from mild to severe and can take place over a number of hours. Symptoms may include:  · Being unable to:  ¨ Raise one or both eyebrows.  ¨ Close  one or both eyes.  ¨ Feel parts of your face (facial numbness).  · Drooping of the eyelid and corner of the mouth.  · Weakness in the face.  · Paralysis of half your face.  · Loss of taste.  · Sensitivity to loud noises.  · Difficulty chewing.  · Tearing up of the affected eye.  · Dryness in the affected eye.  · Drooling.  · Pain behind one ear.  DIAGNOSIS   Diagnosis of Bell palsy may include:  · A medical history and physical exam.  · An MRI.  · A CT scan.  · Electromyography (EMG). This is a test that checks how your nerves are working.  TREATMENT   Treatment may include antiviral medicine to help shorten the length of the condition. Sometimes treatment is not needed and the symptoms go away on their own.  HOME CARE INSTRUCTIONS   · Take medicines only as directed by your health care provider.  · Do facial massages and exercises as directed by your health care provider.  · If your eye is affected:  ¨ Use moisturizing eye drops to prevent drying of your eye as directed by your health care provider.  ¨ Protect your eye as directed by your health care provider.  SEEK MEDICAL CARE IF:  · Your symptoms do not get better or get worse.  · You are drooling.  · Your eye is red, irritated, or hurts.  SEEK IMMEDIATE MEDICAL CARE IF:   · Another part of your body feels weak or numb.  · You have difficulty swallowing.  · You have a fever along with symptoms of Bell palsy.  · You develop neck pain.  MAKE SURE YOU:   · Understand these instructions.  · Will watch your condition.  · Will get help right away if you are not doing well or get worse.  This information is not intended to replace advice given to you by your health care provider. Make sure you discuss any questions you have with your health care provider.  Document Released: 12/18/2006 Document Revised: 09/07/2016 Document Reviewed: 03/27/2015  [employer] Interactive Patient Education © 2017 [employer]    Use Sudafed PE to help drain fluid from the ears.  Avoid  antihistamines due to drying effect on the eyes.  Return to the clinic if not improving.  Keep your follow-up appointment with the neurologist

## 2018-04-27 ENCOUNTER — OFFICE VISIT (OUTPATIENT)
Dept: NEUROLOGY | Facility: CLINIC | Age: 43
End: 2018-04-27

## 2018-04-27 VITALS
BODY MASS INDEX: 51.91 KG/M2 | WEIGHT: 293 LBS | HEIGHT: 63 IN | DIASTOLIC BLOOD PRESSURE: 85 MMHG | SYSTOLIC BLOOD PRESSURE: 132 MMHG

## 2018-04-27 DIAGNOSIS — G51.0 BELL'S PALSY: Primary | ICD-10-CM

## 2018-04-27 PROCEDURE — 99244 OFF/OP CNSLTJ NEW/EST MOD 40: CPT | Performed by: PSYCHIATRY & NEUROLOGY

## 2018-04-27 NOTE — PROGRESS NOTES
"Subjective:    CC: Kanwal Bruner is seen today in consultation at the request of Rico Seymour MD for Bell Palsy       HPI:  Patient is a 42-year-old female with past medical history of hypothyroidism was doing fine until the April 18, 2018 when she noticed sudden onset of weakness of right face and inability to close right  eye.  She immediately decided to go to ER for further evaluation.  She underwent MRI brain which was unremarkable.  She did not have any weakness involving the right upper or lower extremity.  She was found to have Bell's palsy and after a initial lab work she was discharged on a Medrol Dosepak as well as acyclovir therapy.  She reports that since her discharge, initially for a few days she felt better on steroid but then now she reports that the weakness has not improved.  She has been using the eyedrops in the right eye to prevent the from dryness as well as corneal abrasion.  She is also using eye patch at night when she goes to bed.  She does report the mild change in taste.  She she is able to close her right eye about 80-90% now.    The following portions of the patient's history were reviewed today and updated as appropriate: allergies, current medications, past family history, past medical history, past social history, past surgical history and problem list. This document will be scanned to patient's chart.    Review of Systems   Constitutional: Negative.    HENT: Negative.    Eyes: Positive for discharge and visual disturbance.   Respiratory: Negative.    Cardiovascular: Negative.    Musculoskeletal: Negative.    Skin: Negative.    Allergic/Immunologic: Negative.    Neurological: Positive for speech difficulty.   Psychiatric/Behavioral: Negative.      Objective:    /85   Ht 161 cm (63.39\")   Wt (!) 147 kg (325 lb)   BMI 56.87 kg/m²     Neurology Exam:  General apperance: NAD.     Mental status: Alert, awake and oriented to time place and person.    Recent and " Remote memory: Can recall 3/3 objects at 5 minutes. Can recall historical events.     Attention span and Concentration: Serial 7s: Normal.     Fund of knowledge:  Normal.     Language and Speech: No aphasia or dysarthria.    Naming , Repitition and Comprehension:  Can name objects, repeat a sentence and follow commands. Speech is clear and fluent with good repetition, comprehension, and naming.    CN II to XII: Right facial weakness.  Inability to close right eye fully but can close approximately  80%.  Inability to raise eyebrows on the right.  No tongue weakness.    Opthalmoscopic Exam: No papilledema.    Motor:  Right UE muscle strength 5/5. Normal tone.     Left UE muscle strength 5/5. Normal tone.      Right LE muscle strength5/5. Normal tone.     Left LE muscle strength 5/5. Normal tone.      Sensory: Normal light touch, vibration and pinprick sensation bilaterally.    DTRs: 2+ bilaterally.    Babinski: Negative bilaterally.    Co-ordination: Normal finger-to-nose, heel to shin B/L.    Rhomberg: Negative.    Gait: Normal.    Cardiovascular: Regular rate and rhythm without murmur, gallop or rub.  Assessment and Plan:  1. Bell's palsy  Patient with the diagnosis of Bell's palsy with onset approximately 10 days ago.  She is status post the treatment with steroids and acyclovir.  I've explained to the patient that the recovery with Bell's palsy varies from patient to patient and weakness should improve in next few months. Till then  continue using protective eye patch at night and even during daytime if possible.  Continue using the lubricant eyedrops to prevent corneal abrasion as well as the dryness of right eye.  I'll see her back in 4 weeks for reassessment.     Return in about 4 weeks (around 5/25/2018).

## 2018-05-29 ENCOUNTER — OFFICE VISIT (OUTPATIENT)
Dept: NEUROLOGY | Facility: CLINIC | Age: 43
End: 2018-05-29

## 2018-05-29 VITALS
DIASTOLIC BLOOD PRESSURE: 82 MMHG | WEIGHT: 293 LBS | SYSTOLIC BLOOD PRESSURE: 131 MMHG | BODY MASS INDEX: 51.91 KG/M2 | HEIGHT: 63 IN

## 2018-05-29 DIAGNOSIS — G51.0 BELL'S PALSY: Primary | ICD-10-CM

## 2018-05-29 PROCEDURE — 99213 OFFICE O/P EST LOW 20 MIN: CPT | Performed by: PSYCHIATRY & NEUROLOGY

## 2018-05-29 NOTE — PROGRESS NOTES
"Subjective:    CC: Kanwal Bruner is in clinic today for follow up for  Bell's Palsy.    HPI:  Patient is in the clinic for regular follow-up.  She reports that since her last visit, she started using TENS unit on the right facial muscles and that seems to have helped her significantly.  She reports that she is able to close her eye completely now.  She is not using the eye patch.  Right facial strength is almost attend to baseline except for normal weakness involving the right nasal area.    The following portions of the patient's history were reviewed and updated as of 2018: allergies, social history and problem list.       Current Outpatient Prescriptions:   •  EPINEPHrine (EPIPEN) 0.3 MG/0.3ML solution auto-injector injection, Inject 0.3 ML into the shoulder, thigh, or buttocks for 1 dose., Disp: 2 each, Rfl: 11  •  SYNTHROID 200 MCG tablet, Take 1 tablet by mouth Daily., Disp: 90 tablet, Rfl: 3  •  SYNTHROID 50 MCG tablet, Take 1 tablet by mouth Daily., Disp: 90 tablet, Rfl: 3   Past Medical History:   Diagnosis Date   • Hashimoto's disease    • History of gallstones    • History of ovarian cyst    • Thyroid disease       Past Surgical History:   Procedure Laterality Date   • ADENOIDECTOMY     • BREAST BIOPSY Right 2013    Ultrasound guided   •  SECTION     • CHOLECYSTECTOMY     • HYSTERECTOMY  2015    left ovary left      Family History   Problem Relation Age of Onset   • Migraines Mother    • Hyperlipidemia Father    • Obesity Father    • Cancer Father         prostate   • Heart attack Maternal Grandmother    • Migraines Maternal Grandmother    • Stroke Paternal Grandfather    • Hypertension Other    • Thyroid disease Other    • Thyroid disease Cousin    • Breast cancer Maternal Aunt 55        Review of Systems   Respiratory: Negative.    Cardiovascular: Negative.    Neurological: Negative.      Objective:    /82   Ht 161 cm (63.39\")   Wt (!) 147 kg (325 lb)   BMI 56.87 " kg/m²     Neurology Exam:  General apperance: NAD.     Mental status: Alert, awake and oriented to time place and person.    Recent and Remote memory: Can recall 3/3 objects at 5 minutes. Can recall historical events.     Attention span and Concentration: Serial 7s: Normal.     Fund of knowledge:  Normal.     Language and Speech: No aphasia or dysarthria.    Naming , Repitition and Comprehension:  Can name objects, repeat a sentence and follow commands. Speech is clear and fluent with good repetition, comprehension, and naming.    CN II to XII: Significant improvement in right frontalis, right orbicularis oculi and right orbicularis oris muscle strength.  She can now close her eye completely on the right.    Opthalmoscopic Exam: No papilledema.    Motor:  Right UE muscle strength 5/5. Normal tone.     Left UE muscle strength 5/5. Normal tone.      Right LE muscle strength5/5. Normal tone.     Left LE muscle strength 5/5. Normal tone.      Sensory: Normal light touch, vibration and pinprick sensation bilaterally.    DTRs: 2+ bilaterally.    Babinski: Negative bilaterally.    Co-ordination: Normal finger-to-nose, heel to shin B/L.    Rhomberg: Negative.    Gait: Normal.    Cardiovascular: Regular rate and rhythm without murmur, gallop or rub.    Assessment and Plan:  1. Bell's palsy  Significant improvement in the right facial muscle strength.  She has been using TENS unit on the right facial muscles.  I'll see her back in 3 months in follow-up.     I spent 15 minutes face to face with the patient and spent 10 minutes of this time counseling and discussing about the good sleep hygiene and regular exercises and taking medications regularly.    Return in about 3 months (around 8/29/2018).

## 2018-06-11 DIAGNOSIS — E89.0 POSTABLATIVE HYPOTHYROIDISM: ICD-10-CM

## 2018-06-11 RX ORDER — LEVOTHYROXINE SODIUM 200 MCG
200 TABLET ORAL DAILY
Qty: 90 TABLET | Refills: 3 | Status: SHIPPED | OUTPATIENT
Start: 2018-06-11 | End: 2018-08-29 | Stop reason: SDUPTHER

## 2018-06-11 NOTE — TELEPHONE ENCOUNTER
PT NEEDS 30 DAY SUPPLY TO [facility] ON [address] AND THEN SHE ALSO WANTS A SCRIPT MAILED TO HER SO SHE CAN SEND IT IN WHEN SHE SETS UP AN ACCOUNT WITH A MAIL DELIVERY SERVICE. PLEASE ADVISE.

## 2018-08-29 ENCOUNTER — OFFICE VISIT (OUTPATIENT)
Dept: NEUROLOGY | Facility: CLINIC | Age: 43
End: 2018-08-29

## 2018-08-29 ENCOUNTER — OFFICE VISIT (OUTPATIENT)
Dept: ENDOCRINOLOGY | Facility: CLINIC | Age: 43
End: 2018-08-29

## 2018-08-29 VITALS
HEART RATE: 60 BPM | OXYGEN SATURATION: 99 % | BODY MASS INDEX: 51.91 KG/M2 | SYSTOLIC BLOOD PRESSURE: 128 MMHG | HEIGHT: 63 IN | DIASTOLIC BLOOD PRESSURE: 82 MMHG | WEIGHT: 293 LBS

## 2018-08-29 VITALS
BODY MASS INDEX: 51.91 KG/M2 | WEIGHT: 293 LBS | HEIGHT: 63 IN | SYSTOLIC BLOOD PRESSURE: 123 MMHG | DIASTOLIC BLOOD PRESSURE: 85 MMHG

## 2018-08-29 DIAGNOSIS — E55.9 VITAMIN D DEFICIENCY: Primary | ICD-10-CM

## 2018-08-29 DIAGNOSIS — E06.3 HYPOTHYROIDISM DUE TO HASHIMOTO'S THYROIDITIS: ICD-10-CM

## 2018-08-29 DIAGNOSIS — E03.8 HYPOTHYROIDISM DUE TO HASHIMOTO'S THYROIDITIS: ICD-10-CM

## 2018-08-29 DIAGNOSIS — G51.0 BELL'S PALSY: Primary | ICD-10-CM

## 2018-08-29 PROCEDURE — 99213 OFFICE O/P EST LOW 20 MIN: CPT | Performed by: INTERNAL MEDICINE

## 2018-08-29 PROCEDURE — 99213 OFFICE O/P EST LOW 20 MIN: CPT | Performed by: PSYCHIATRY & NEUROLOGY

## 2018-08-29 RX ORDER — LEVOTHYROXINE SODIUM 200 MCG
200 TABLET ORAL DAILY
Qty: 90 TABLET | Refills: 3 | Status: SHIPPED | OUTPATIENT
Start: 2018-08-29 | End: 2019-03-22

## 2018-08-29 RX ORDER — LEVOTHYROXINE SODIUM 50 MCG
50 TABLET ORAL DAILY
Qty: 90 TABLET | Refills: 3 | Status: SHIPPED | OUTPATIENT
Start: 2018-08-29 | End: 2019-04-09 | Stop reason: SDUPTHER

## 2018-08-29 NOTE — PROGRESS NOTES
"Subjective:    CC: Kanwal Bruner is in clinic today for follow up for  Bell's palsy on the right.        HPI:  She is in clinic for regular follow-up.  Since her last visit, she reports that right facial weakness has completely resolved.  She denies any neurological signs or symptoms.  She has not been using TENS unit.  No problems with chewing, eye closure.    The following portions of the patient's history were reviewed and updated as of 2018: allergies, social history and problem list.       Current Outpatient Prescriptions:   •  EPINEPHrine (EPIPEN) 0.3 MG/0.3ML solution auto-injector injection, Inject 0.3 ML into the shoulder, thigh, or buttocks for 1 dose., Disp: 2 each, Rfl: 11  •  SYNTHROID 200 MCG tablet, Take 1 tablet by mouth Daily., Disp: 90 tablet, Rfl: 3  •  SYNTHROID 50 MCG tablet, Take 1 tablet by mouth Daily., Disp: 90 tablet, Rfl: 3   Past Medical History:   Diagnosis Date   • Hashimoto's disease    • History of gallstones    • History of ovarian cyst    • Thyroid disease       Past Surgical History:   Procedure Laterality Date   • ADENOIDECTOMY     • BREAST BIOPSY Right 2013    Ultrasound guided   •  SECTION     • CHOLECYSTECTOMY     • HYSTERECTOMY  2015    left ovary left      Family History   Problem Relation Age of Onset   • Migraines Mother    • Hyperlipidemia Father    • Obesity Father    • Cancer Father         prostate   • Heart attack Maternal Grandmother    • Migraines Maternal Grandmother    • Stroke Paternal Grandfather    • Hypertension Other    • Thyroid disease Other    • Thyroid disease Cousin    • Breast cancer Maternal Aunt 55        Review of Systems   Respiratory: Negative.    Cardiovascular: Negative.    Neurological: Negative.      Objective:    /85   Ht 161 cm (63.39\")   Wt (!) 143 kg (315 lb)   BMI 55.12 kg/m²     Neurology Exam:  General apperance: NAD.     Mental status: Alert, awake and oriented to time place and person.    Recent and " Remote memory: Can recall 3/3 objects at 5 minutes. Can recall historical events.     Attention span and Concentration: Serial 7s: Normal.     Fund of knowledge:  Normal.     Language and Speech: No aphasia or dysarthria.    Naming , Repitition and Comprehension:  Can name objects, repeat a sentence and follow commands. Speech is clear and fluent with good repetition, comprehension, and naming.    CN II to XII: Intact.    Opthalmoscopic Exam: No papilledema.    Motor:  Right UE muscle strength 5/5. Normal tone.     Left UE muscle strength 5/5. Normal tone.      Right LE muscle strength5/5. Normal tone.     Left LE muscle strength 5/5. Normal tone.      Sensory: Normal light touch, vibration and pinprick sensation bilaterally.    DTRs: 2+ bilaterally.    Babinski: Negative bilaterally.    Co-ordination: Normal finger-to-nose, heel to shin B/L.    Rhomberg: Negative.    Gait: Normal.    Cardiovascular: Regular rate and rhythm without murmur, gallop or rub.    Assessment and Plan:  1. Bell's palsy  Complete resolution of the Bell's palsy on the right.  She has been doing well and denies any recurrent symptoms.  She has stopped using TENS unit as well.  I'll see her in clinic as needed.       I spent 15 minutes face to face with the patient and spent 10 minutes of this time counseling and discussing about taking medication regularly, possible side effects with medication use, importance of good sleep hygiene, good hydration and regular exercise.    Return if symptoms worsen or fail to improve.

## 2018-08-29 NOTE — PROGRESS NOTES
Chief complaint  Hypothyroidism (Follow Up: accidently didn;t take enough Thyroid Rx,  Would like a hard copy of her RX so she can mail then in )    Subjective   Kanwal Bruner is a 42 y.o. female is here today for follow-up.   PCOS - Clinical features include oligomenorrhea and insulin resistance.   Metformin caused diarrhea and heart palpitations. She stopped medication.    Symptoms: acne, skin darkening, weight gain.     Hypothyroidism. The patient has Hashimoto's thyroiditis dx 2004. Current treatment includes levothyroxine (Synthroid)  250 mcg daily.  Patient reported that in the last week she has taken a wrong dose of medication (possibly 100 mcg daily instead of 250 mcg). She didn't notice any difference in sx, but would like to postpone labs until stable on the current dose.      Vit D deficiency  She stopped Vit D supplements because of diarrhea.  Her last visit Vit D was low.     She has multiple food intolerance, sensitivity to gluten.      Hashimoto's Thyroiditis   Associated symptoms include weakness. Pertinent negatives include no abdominal pain, arthralgias, chest pain, chills, congestion, coughing, diaphoresis, headaches, joint swelling, myalgias, nausea, neck pain, sore throat or vomiting.   Hypothyroidism   Associated symptoms include weakness. Pertinent negatives include no abdominal pain, arthralgias, chest pain, chills, congestion, coughing, diaphoresis, headaches, joint swelling, myalgias, nausea, neck pain, sore throat or vomiting.       Medications    Current Outpatient Prescriptions:   •  EPINEPHrine (EPIPEN) 0.3 MG/0.3ML solution auto-injector injection, Inject 0.3 ML into the shoulder, thigh, or buttocks for 1 dose., Disp: 2 each, Rfl: 11  •  SYNTHROID 200 MCG tablet, Take 1 tablet by mouth Daily., Disp: 90 tablet, Rfl: 3  •  SYNTHROID 50 MCG tablet, Take 1 tablet by mouth Daily., Disp: 90 tablet, Rfl: 3    PMH  The following portions of the patient's history were reviewed and updated  "as appropriate: allergies, current medications, past family history, past medical history, past social history, past surgical history and problem list.    Review of systems  Review of Systems   Constitutional: Positive for unexpected weight change (weight gain). Negative for activity change, appetite change, chills and diaphoresis.   HENT: Negative for congestion, ear pain, facial swelling, hearing loss, postnasal drip, sore throat, trouble swallowing and voice change.    Eyes: Negative.  Negative for redness, itching and visual disturbance.   Respiratory: Negative for cough and shortness of breath.    Cardiovascular: Negative for chest pain, palpitations and leg swelling.   Gastrointestinal: Positive for diarrhea. Negative for abdominal distention, abdominal pain, constipation, nausea and vomiting.   Endocrine:        As listed in HPI   Genitourinary: Negative.    Musculoskeletal: Negative for arthralgias, back pain, joint swelling, myalgias, neck pain and neck stiffness.   Skin: Negative.    Allergic/Immunologic: Negative.    Neurological: Positive for weakness. Negative for dizziness, tremors, syncope, light-headedness and headaches.   Hematological: Negative.    Psychiatric/Behavioral: Negative.    All other systems reviewed and are negative.      Physical exam  Objective   Blood pressure 128/82, pulse 60, height 161 cm (63.39\"), weight (!) 143 kg (315 lb 3.2 oz), SpO2 99 %.   Physical Exam   Constitutional: She is oriented to person, place, and time. She appears well-developed and well-nourished.   Morbidly obese   HENT:   Head: Normocephalic and atraumatic.   Eyes: Conjunctivae are normal.   Neck: No thyromegaly present.   The neck is supple and symmetric   Cardiovascular: Normal rate, regular rhythm and normal heart sounds.    Pulmonary/Chest: Effort normal and breath sounds normal.   Musculoskeletal: She exhibits no edema.   Lymphadenopathy:     She has no cervical adenopathy.   Neurological: She is alert " and oriented to person, place, and time.   Skin: Skin is warm and dry. No rash noted.   Psychiatric: She has a normal mood and affect. Thought content normal.   Vitals reviewed.        Assessment  Assessment/Plan   Problem List Items Addressed This Visit        Digestive    Vitamin D deficiency - Primary       Endocrine    Hypothyroidism due to Hashimoto's thyroiditis    Relevant Medications    SYNTHROID 50 MCG tablet    SYNTHROID 200 MCG tablet    Other Relevant Orders    Comprehensive Metabolic Panel    TSH    T4, Free    Lipid Panel    Hemoglobin A1c          Plan  -Synthroid 250 mcg. Repeat labs today to adjust thyroid levels in 3-4 weeks.   She will come for fasting labs in 3-4 weeks.     Follow-up in 4-6 months.

## 2018-09-28 ENCOUNTER — LAB (OUTPATIENT)
Dept: LAB | Facility: HOSPITAL | Age: 43
End: 2018-09-28

## 2018-09-28 DIAGNOSIS — E06.3 HYPOTHYROIDISM DUE TO HASHIMOTO'S THYROIDITIS: ICD-10-CM

## 2018-09-28 DIAGNOSIS — E03.8 HYPOTHYROIDISM DUE TO HASHIMOTO'S THYROIDITIS: ICD-10-CM

## 2018-09-28 LAB
ALBUMIN SERPL-MCNC: 4.14 G/DL (ref 3.2–4.8)
ALBUMIN/GLOB SERPL: 1.7 G/DL (ref 1.5–2.5)
ALP SERPL-CCNC: 50 U/L (ref 25–100)
ALT SERPL W P-5'-P-CCNC: 30 U/L (ref 7–40)
ANION GAP SERPL CALCULATED.3IONS-SCNC: 11 MMOL/L (ref 3–11)
ARTICHOKE IGE QN: 119 MG/DL (ref 0–130)
AST SERPL-CCNC: 16 U/L (ref 0–33)
BILIRUB SERPL-MCNC: 0.3 MG/DL (ref 0.3–1.2)
BUN BLD-MCNC: 21 MG/DL (ref 9–23)
BUN/CREAT SERPL: 35.6 (ref 7–25)
CALCIUM SPEC-SCNC: 9 MG/DL (ref 8.7–10.4)
CHLORIDE SERPL-SCNC: 103 MMOL/L (ref 99–109)
CHOLEST SERPL-MCNC: 170 MG/DL (ref 0–200)
CO2 SERPL-SCNC: 24 MMOL/L (ref 20–31)
CREAT BLD-MCNC: 0.59 MG/DL (ref 0.6–1.3)
GFR SERPL CREATININE-BSD FRML MDRD: 111 ML/MIN/1.73
GLOBULIN UR ELPH-MCNC: 2.5 GM/DL
GLUCOSE BLD-MCNC: 111 MG/DL (ref 70–100)
HBA1C MFR BLD: 5.9 % (ref 4.8–5.6)
HDLC SERPL-MCNC: 37 MG/DL (ref 40–60)
POTASSIUM BLD-SCNC: 4.1 MMOL/L (ref 3.5–5.5)
PROT SERPL-MCNC: 6.6 G/DL (ref 5.7–8.2)
SODIUM BLD-SCNC: 138 MMOL/L (ref 132–146)
T4 FREE SERPL-MCNC: 1.4 NG/DL (ref 0.89–1.76)
TRIGL SERPL-MCNC: 140 MG/DL (ref 0–150)
TSH SERPL DL<=0.05 MIU/L-ACNC: 0.17 MIU/ML (ref 0.35–5.35)

## 2018-09-28 PROCEDURE — 84439 ASSAY OF FREE THYROXINE: CPT

## 2018-09-28 PROCEDURE — 83036 HEMOGLOBIN GLYCOSYLATED A1C: CPT

## 2018-09-28 PROCEDURE — 80053 COMPREHEN METABOLIC PANEL: CPT

## 2018-09-28 PROCEDURE — 80061 LIPID PANEL: CPT

## 2018-09-28 PROCEDURE — 84443 ASSAY THYROID STIM HORMONE: CPT

## 2018-11-15 ENCOUNTER — TRANSCRIBE ORDERS (OUTPATIENT)
Dept: ADMINISTRATIVE | Facility: HOSPITAL | Age: 43
End: 2018-11-15

## 2018-11-15 DIAGNOSIS — Z12.31 VISIT FOR SCREENING MAMMOGRAM: Primary | ICD-10-CM

## 2018-12-12 ENCOUNTER — TELEPHONE (OUTPATIENT)
Dept: INTERNAL MEDICINE | Facility: CLINIC | Age: 43
End: 2018-12-12

## 2018-12-12 DIAGNOSIS — E28.2 PCOS (POLYCYSTIC OVARIAN SYNDROME): Primary | ICD-10-CM

## 2018-12-12 DIAGNOSIS — E03.8 HYPOTHYROIDISM DUE TO HASHIMOTO'S THYROIDITIS: ICD-10-CM

## 2018-12-12 DIAGNOSIS — E06.3 HYPOTHYROIDISM DUE TO HASHIMOTO'S THYROIDITIS: ICD-10-CM

## 2018-12-12 NOTE — TELEPHONE ENCOUNTER
Pt wanted to know if she could come in for blood work for MTHFR   Her mother, 2 aunts and a cousin tested positive for this,  I told her she n=might need to be seen for an appt first  Please call pt [phone]

## 2018-12-17 ENCOUNTER — LAB (OUTPATIENT)
Dept: LAB | Facility: HOSPITAL | Age: 43
End: 2018-12-17

## 2018-12-17 DIAGNOSIS — E06.3 HYPOTHYROIDISM DUE TO HASHIMOTO'S THYROIDITIS: ICD-10-CM

## 2018-12-17 DIAGNOSIS — E03.8 HYPOTHYROIDISM DUE TO HASHIMOTO'S THYROIDITIS: ICD-10-CM

## 2018-12-17 DIAGNOSIS — E28.2 PCOS (POLYCYSTIC OVARIAN SYNDROME): ICD-10-CM

## 2018-12-17 PROCEDURE — 81291 MTHFR GENE: CPT

## 2018-12-21 ENCOUNTER — HOSPITAL ENCOUNTER (OUTPATIENT)
Dept: MAMMOGRAPHY | Facility: HOSPITAL | Age: 43
Discharge: HOME | End: 2018-12-21
Attending: OBSTETRICS & GYNECOLOGY | Admitting: OBSTETRICS & GYNECOLOGY

## 2018-12-21 DIAGNOSIS — Z12.31 VISIT FOR SCREENING MAMMOGRAM: ICD-10-CM

## 2018-12-21 PROCEDURE — 77063 BREAST TOMOSYNTHESIS BI: CPT | Performed by: RADIOLOGY

## 2018-12-21 PROCEDURE — 77063 BREAST TOMOSYNTHESIS BI: CPT

## 2018-12-21 PROCEDURE — 77067 SCR MAMMO BI INCL CAD: CPT | Performed by: RADIOLOGY

## 2018-12-21 PROCEDURE — 77067 SCR MAMMO BI INCL CAD: CPT

## 2018-12-23 ENCOUNTER — OFFICE VISIT (OUTPATIENT)
Dept: INTERNAL MEDICINE | Facility: CLINIC | Age: 43
End: 2018-12-23

## 2018-12-23 VITALS — TEMPERATURE: 97.9 F | OXYGEN SATURATION: 98 % | WEIGHT: 293 LBS | BODY MASS INDEX: 55.47 KG/M2 | HEART RATE: 82 BPM

## 2018-12-23 DIAGNOSIS — H10.9 BACTERIAL CONJUNCTIVITIS OF LEFT EYE: ICD-10-CM

## 2018-12-23 DIAGNOSIS — T78.40XA ALLERGIC REACTION TO DRUG, INITIAL ENCOUNTER: Primary | ICD-10-CM

## 2018-12-23 DIAGNOSIS — J01.00 ACUTE MAXILLARY SINUSITIS, RECURRENCE NOT SPECIFIED: ICD-10-CM

## 2018-12-23 PROCEDURE — 99214 OFFICE O/P EST MOD 30 MIN: CPT | Performed by: NURSE PRACTITIONER

## 2018-12-23 RX ORDER — LOTEPREDNOL ETABONATE 5 MG/ML
1 SUSPENSION/ DROPS OPHTHALMIC 4 TIMES DAILY
Qty: 10 ML | Refills: 0 | Status: SHIPPED | OUTPATIENT
Start: 2018-12-23 | End: 2019-03-22

## 2018-12-23 RX ORDER — ERYTHROMYCIN 5 MG/G
OINTMENT OPHTHALMIC 3 TIMES DAILY
Qty: 1 G | Refills: 0 | Status: SHIPPED | OUTPATIENT
Start: 2018-12-23 | End: 2018-12-30

## 2018-12-23 RX ORDER — AMOXICILLIN 875 MG/1
875 TABLET, COATED ORAL EVERY 12 HOURS SCHEDULED
Qty: 20 TABLET | Refills: 0 | Status: SHIPPED | OUTPATIENT
Start: 2018-12-23 | End: 2019-03-22

## 2018-12-24 LAB — MTHFR GENE MUT ANL BLD/T: NORMAL

## 2018-12-29 PROBLEM — Z15.89 MTHFR GENE MUTATION: Status: ACTIVE | Noted: 2018-12-29

## 2019-01-31 ENCOUNTER — TELEPHONE (OUTPATIENT)
Dept: INTERNAL MEDICINE | Facility: CLINIC | Age: 44
End: 2019-01-31

## 2019-01-31 RX ORDER — OSELTAMIVIR PHOSPHATE 75 MG/1
75 CAPSULE ORAL DAILY
Qty: 10 CAPSULE | Refills: 0 | Status: SHIPPED | OUTPATIENT
Start: 2019-01-31 | End: 2019-03-22

## 2019-01-31 NOTE — TELEPHONE ENCOUNTER
You can call in Tamiflu: 75 mg po day x 10 days. 10 tablets. I understand this is prophylaxis and not a treatment, correct?

## 2019-01-31 NOTE — TELEPHONE ENCOUNTER
PATIENT USED TO SEE GAYATRI AND SHE IS STILL SEEING DR. JUAN.     HER DAUGHTER WAS DIAGNOSED WITH THE FLU YESTERDAY AND IS WANTING TO KNOW IF TAMIFLU CAN BE CALLED IN FOR HER. SHE IS UNABLE TO COME IN FOR AN APPT BECAUSE HER DAUGHTER IS SICK. PLEASE ADVISE. THANKS.

## 2019-03-22 ENCOUNTER — OFFICE VISIT (OUTPATIENT)
Dept: ENDOCRINOLOGY | Facility: CLINIC | Age: 44
End: 2019-03-22

## 2019-03-22 VITALS
BODY MASS INDEX: 51.91 KG/M2 | DIASTOLIC BLOOD PRESSURE: 78 MMHG | SYSTOLIC BLOOD PRESSURE: 124 MMHG | HEIGHT: 63 IN | OXYGEN SATURATION: 98 % | HEART RATE: 73 BPM | WEIGHT: 293 LBS

## 2019-03-22 DIAGNOSIS — E03.8 HYPOTHYROIDISM DUE TO HASHIMOTO'S THYROIDITIS: Primary | ICD-10-CM

## 2019-03-22 DIAGNOSIS — E55.9 VITAMIN D DEFICIENCY: ICD-10-CM

## 2019-03-22 DIAGNOSIS — R73.03 PREDIABETES: ICD-10-CM

## 2019-03-22 DIAGNOSIS — E06.3 HYPOTHYROIDISM DUE TO HASHIMOTO'S THYROIDITIS: Primary | ICD-10-CM

## 2019-03-22 LAB
25(OH)D3 SERPL-MCNC: 20.7 NG/ML
ALBUMIN SERPL-MCNC: 4.16 G/DL (ref 3.2–4.8)
ALBUMIN/GLOB SERPL: 1.7 G/DL (ref 1.5–2.5)
ALP SERPL-CCNC: 55 U/L (ref 25–100)
ALT SERPL W P-5'-P-CCNC: 24 U/L (ref 7–40)
ANION GAP SERPL CALCULATED.3IONS-SCNC: 4 MMOL/L (ref 3–11)
ARTICHOKE IGE QN: 131 MG/DL (ref 0–130)
AST SERPL-CCNC: 14 U/L (ref 0–33)
BILIRUB SERPL-MCNC: 0.3 MG/DL (ref 0.3–1.2)
BUN BLD-MCNC: 16 MG/DL (ref 9–23)
BUN/CREAT SERPL: 27.1 (ref 7–25)
CALCIUM SPEC-SCNC: 9.1 MG/DL (ref 8.7–10.4)
CHLORIDE SERPL-SCNC: 105 MMOL/L (ref 99–109)
CHOLEST SERPL-MCNC: 185 MG/DL (ref 0–200)
CO2 SERPL-SCNC: 29 MMOL/L (ref 20–31)
CREAT BLD-MCNC: 0.59 MG/DL (ref 0.6–1.3)
GFR SERPL CREATININE-BSD FRML MDRD: 110 ML/MIN/1.73
GLOBULIN UR ELPH-MCNC: 2.4 GM/DL
GLUCOSE BLD-MCNC: 112 MG/DL (ref 70–100)
HBA1C MFR BLD: 5.5 % (ref 4.8–5.6)
HDLC SERPL-MCNC: 46 MG/DL (ref 40–60)
POTASSIUM BLD-SCNC: 4.4 MMOL/L (ref 3.5–5.5)
PROT SERPL-MCNC: 6.6 G/DL (ref 5.7–8.2)
SODIUM BLD-SCNC: 138 MMOL/L (ref 132–146)
T4 FREE SERPL-MCNC: 1.44 NG/DL (ref 0.89–1.76)
TRIGL SERPL-MCNC: 135 MG/DL (ref 0–150)
TSH SERPL DL<=0.05 MIU/L-ACNC: 1.8 MIU/ML (ref 0.35–5.35)

## 2019-03-22 PROCEDURE — 80061 LIPID PANEL: CPT | Performed by: INTERNAL MEDICINE

## 2019-03-22 PROCEDURE — 84439 ASSAY OF FREE THYROXINE: CPT | Performed by: INTERNAL MEDICINE

## 2019-03-22 PROCEDURE — 80053 COMPREHEN METABOLIC PANEL: CPT | Performed by: INTERNAL MEDICINE

## 2019-03-22 PROCEDURE — 82306 VITAMIN D 25 HYDROXY: CPT | Performed by: INTERNAL MEDICINE

## 2019-03-22 PROCEDURE — 84443 ASSAY THYROID STIM HORMONE: CPT | Performed by: INTERNAL MEDICINE

## 2019-03-22 PROCEDURE — 99213 OFFICE O/P EST LOW 20 MIN: CPT | Performed by: INTERNAL MEDICINE

## 2019-03-22 PROCEDURE — 83036 HEMOGLOBIN GLYCOSYLATED A1C: CPT | Performed by: INTERNAL MEDICINE

## 2019-03-22 RX ORDER — LEVOTHYROXINE SODIUM 200 MCG
200 TABLET ORAL DAILY
Qty: 90 TABLET | Refills: 3 | Status: SHIPPED | OUTPATIENT
Start: 2019-03-22 | End: 2019-04-09 | Stop reason: SDUPTHER

## 2019-03-22 NOTE — PROGRESS NOTES
Chief complaint  Hypothyroidism (Follow UP  Having Sx of coldness ) and Vitamin D Deficiency    Subjective   Kanwal Bruner is a 43 y.o. female is here today for follow-up.   PCOS - Clinical features include oligomenorrhea and insulin resistance.   Metformin caused diarrhea and heart palpitations. She stopped medication and diarrhea resolved.    Symptoms: acne, skin darkening, weight gain.     Hypothyroidism. The patient has Hashimoto's thyroiditis dx 2004. Current treatment includes levothyroxine (Synthroid)  225 mcg daily.  09/2018 we have reduced the dose to 225 mcg daily.      Vit D deficiency  She stopped Vit D supplements because of diarrhea.  Her last visit Vit D was low.     Prediabetes with A1C of 5.9 in 09/2018. She is exercising - water aerobics, follows the diet. She still drinks soda in the morning and she is encouraged to cut down on sodas.     She c/o cold intolerance, fatigue, weight gain.     Medications    Current Outpatient Medications:   •  EPINEPHrine (EPIPEN) 0.3 MG/0.3ML solution auto-injector injection, Inject 0.3 ML into the shoulder, thigh, or buttocks for 1 dose., Disp: 2 each, Rfl: 11  •  SYNTHROID 200 MCG tablet, Take 1 tablet by mouth Daily. (Patient taking differently: Take 225 mcg by mouth Daily.), Disp: 90 tablet, Rfl: 3  •  SYNTHROID 50 MCG tablet, Take 1 tablet by mouth Daily., Disp: 90 tablet, Rfl: 3    PMH  The following portions of the patient's history were reviewed and updated as appropriate: allergies, current medications, past family history, past medical history, past social history, past surgical history and problem list.    Review of systems  Review of Systems   Constitutional: Positive for unexpected weight change (weight gain). Negative for activity change, appetite change, chills and diaphoresis.   HENT: Negative for congestion, ear pain, facial swelling, hearing loss, postnasal drip, sore throat, trouble swallowing and voice change.    Eyes: Negative.  Negative  "for redness, itching and visual disturbance.   Respiratory: Negative for cough and shortness of breath.    Cardiovascular: Negative for chest pain, palpitations and leg swelling.   Gastrointestinal: Negative for abdominal distention, abdominal pain, constipation, diarrhea, nausea and vomiting.   Endocrine:        As listed in HPI   Genitourinary: Negative.    Musculoskeletal: Negative for arthralgias, back pain, joint swelling, myalgias, neck pain and neck stiffness.   Skin: Negative.    Allergic/Immunologic: Negative.    Neurological: Positive for weakness. Negative for dizziness, tremors, syncope, light-headedness and headaches.   Hematological: Negative.    Psychiatric/Behavioral: Negative.    All other systems reviewed and are negative.      Physical exam  Objective   Blood pressure 124/78, pulse 73, height 161 cm (63.39\"), weight (!) 150 kg (329 lb 9.6 oz), SpO2 98 %, not currently breastfeeding.   Physical Exam   Constitutional: She is oriented to person, place, and time. She appears well-developed and well-nourished.   Morbidly obese   HENT:   Head: Normocephalic and atraumatic.   Eyes: Conjunctivae are normal.   Neck: No thyromegaly present.   The neck is supple and symmetric   Cardiovascular: Normal rate, regular rhythm and normal heart sounds.   Pulmonary/Chest: Effort normal and breath sounds normal.   Musculoskeletal: She exhibits no edema.   Lymphadenopathy:     She has no cervical adenopathy.   Neurological: She is alert and oriented to person, place, and time.   Skin: Skin is warm and dry. No rash noted.   Psychiatric: She has a normal mood and affect. Thought content normal.   Vitals reviewed.        Assessment  Assessment/Plan   Problem List Items Addressed This Visit        Digestive    Vitamin D deficiency       Endocrine    Hypothyroidism due to Hashimoto's thyroiditis - Primary       Other    Prediabetes          Plan  -Synthroid 225 mcg. Repeat labs today to adjust thyroid levels.   -cont with " lifestyle and cut back on soda   - fasting labs today.     Follow-up in 4-6 months.

## 2019-04-08 ENCOUNTER — TELEPHONE (OUTPATIENT)
Dept: INTERNAL MEDICINE | Facility: CLINIC | Age: 44
End: 2019-04-08

## 2019-04-08 NOTE — TELEPHONE ENCOUNTER
PT.  WOULD LIKE TO  GO AHEAD WITH PROGRAM THAT SHE SPOKE TO  ABOUT 2 WEEKS AGO; THE COST OF THE SYNTHROID WOULD BE $65.00 PER MONTH OR EVERY 3 MONTHS; SHE WOULD NEED  MCG AND 25 MCG; PLEASE CALL PT.  AND DISCUSS [phone]

## 2019-04-09 DIAGNOSIS — E06.3 HYPOTHYROIDISM DUE TO HASHIMOTO'S THYROIDITIS: ICD-10-CM

## 2019-04-09 DIAGNOSIS — E03.8 HYPOTHYROIDISM DUE TO HASHIMOTO'S THYROIDITIS: ICD-10-CM

## 2019-04-09 RX ORDER — LEVOTHYROXINE SODIUM 50 MCG
50 TABLET ORAL DAILY
Qty: 90 TABLET | Refills: 3 | Status: SHIPPED | OUTPATIENT
Start: 2019-04-09 | End: 2019-04-10

## 2019-04-09 RX ORDER — LEVOTHYROXINE SODIUM 50 MCG
50 TABLET ORAL DAILY
Qty: 90 TABLET | Refills: 3 | Status: SHIPPED | OUTPATIENT
Start: 2019-04-09 | End: 2019-04-09 | Stop reason: SDUPTHER

## 2019-04-09 RX ORDER — LEVOTHYROXINE SODIUM 200 MCG
200 TABLET ORAL DAILY
Qty: 90 TABLET | Refills: 3 | Status: SHIPPED | OUTPATIENT
Start: 2019-04-09 | End: 2019-04-10

## 2019-04-09 NOTE — TELEPHONE ENCOUNTER
Spoke with patent she stated it was to go to a special pharmacy.  Please advise on the location that she can get it for $65.00 for 90 days supply

## 2019-04-10 DIAGNOSIS — E06.3 HYPOTHYROIDISM DUE TO HASHIMOTO'S THYROIDITIS: ICD-10-CM

## 2019-04-10 DIAGNOSIS — E03.8 HYPOTHYROIDISM DUE TO HASHIMOTO'S THYROIDITIS: ICD-10-CM

## 2019-04-10 RX ORDER — LEVOTHYROXINE SODIUM 200 MCG
200 TABLET ORAL DAILY
Qty: 90 TABLET | Refills: 3 | Status: SHIPPED | OUTPATIENT
Start: 2019-04-10 | End: 2019-12-26 | Stop reason: SDUPTHER

## 2019-04-10 RX ORDER — LEVOTHYROXINE SODIUM 25 MCG
25 TABLET ORAL DAILY
Qty: 90 TABLET | Refills: 3 | Status: SHIPPED | OUTPATIENT
Start: 2019-04-10 | End: 2019-09-17

## 2019-04-10 RX ORDER — LEVOTHYROXINE SODIUM 50 MCG
50 TABLET ORAL DAILY
Qty: 90 TABLET | Refills: 3 | Status: SHIPPED | OUTPATIENT
Start: 2019-04-10 | End: 2019-04-10

## 2019-04-10 NOTE — TELEPHONE ENCOUNTER
I have sent synthroid 25 mcg and synthroid 200 mcg to [facility] pharmacy in [place], synthroid direct. They supposed to call patient for the info. Please ask her to make sure they send 25 and 200. I did sign at first 50 mcg by mistake and they could possibly have all 3 rx. She can reach them at [phone]

## 2019-09-13 ENCOUNTER — TELEPHONE (OUTPATIENT)
Dept: INTERNAL MEDICINE | Facility: CLINIC | Age: 44
End: 2019-09-13

## 2019-09-13 ENCOUNTER — OFFICE VISIT (OUTPATIENT)
Dept: INTERNAL MEDICINE | Facility: CLINIC | Age: 44
End: 2019-09-13

## 2019-09-13 VITALS
SYSTOLIC BLOOD PRESSURE: 128 MMHG | BODY MASS INDEX: 51.91 KG/M2 | DIASTOLIC BLOOD PRESSURE: 84 MMHG | WEIGHT: 293 LBS | TEMPERATURE: 98.3 F | OXYGEN SATURATION: 97 % | HEIGHT: 63 IN | HEART RATE: 87 BPM

## 2019-09-13 DIAGNOSIS — M79.89 LEFT LEG SWELLING: Primary | ICD-10-CM

## 2019-09-13 DIAGNOSIS — E03.8 HYPOTHYROIDISM DUE TO HASHIMOTO'S THYROIDITIS: ICD-10-CM

## 2019-09-13 DIAGNOSIS — E55.9 VITAMIN D DEFICIENCY: ICD-10-CM

## 2019-09-13 DIAGNOSIS — E06.3 HYPOTHYROIDISM DUE TO HASHIMOTO'S THYROIDITIS: ICD-10-CM

## 2019-09-13 LAB
25(OH)D3 SERPL-MCNC: 21.7 NG/ML (ref 30–100)
ALBUMIN SERPL-MCNC: 4.6 G/DL (ref 3.5–5.2)
ALBUMIN/GLOB SERPL: 1.8 G/DL
ALP SERPL-CCNC: 52 U/L (ref 39–117)
ALT SERPL W P-5'-P-CCNC: 22 U/L (ref 1–33)
ANION GAP SERPL CALCULATED.3IONS-SCNC: 13.9 MMOL/L (ref 5–15)
AST SERPL-CCNC: 21 U/L (ref 1–32)
BILIRUB SERPL-MCNC: 0.3 MG/DL (ref 0.2–1.2)
BUN BLD-MCNC: 13 MG/DL (ref 6–20)
BUN/CREAT SERPL: 16.9 (ref 7–25)
CALCIUM SPEC-SCNC: 9 MG/DL (ref 8.6–10.5)
CHLORIDE SERPL-SCNC: 101 MMOL/L (ref 98–107)
CHOLEST SERPL-MCNC: 161 MG/DL (ref 0–200)
CO2 SERPL-SCNC: 24.1 MMOL/L (ref 22–29)
CREAT BLD-MCNC: 0.77 MG/DL (ref 0.57–1)
DEPRECATED RDW RBC AUTO: 43 FL (ref 37–54)
ERYTHROCYTE [DISTWIDTH] IN BLOOD BY AUTOMATED COUNT: 13.3 % (ref 12.3–15.4)
GFR SERPL CREATININE-BSD FRML MDRD: 81 ML/MIN/1.73
GLOBULIN UR ELPH-MCNC: 2.6 GM/DL
GLUCOSE BLD-MCNC: 103 MG/DL (ref 65–99)
HCT VFR BLD AUTO: 43.4 % (ref 34–46.6)
HDLC SERPL-MCNC: 36 MG/DL (ref 40–60)
HGB BLD-MCNC: 13.8 G/DL (ref 12–15.9)
LDLC SERPL CALC-MCNC: 98 MG/DL (ref 0–100)
LDLC/HDLC SERPL: 2.71 {RATIO}
MCH RBC QN AUTO: 28.1 PG (ref 26.6–33)
MCHC RBC AUTO-ENTMCNC: 31.8 G/DL (ref 31.5–35.7)
MCV RBC AUTO: 88.4 FL (ref 79–97)
PLATELET # BLD AUTO: 322 10*3/MM3 (ref 140–450)
PMV BLD AUTO: 11 FL (ref 6–12)
POTASSIUM BLD-SCNC: 4 MMOL/L (ref 3.5–5.2)
PROT SERPL-MCNC: 7.2 G/DL (ref 6–8.5)
RBC # BLD AUTO: 4.91 10*6/MM3 (ref 3.77–5.28)
SODIUM BLD-SCNC: 139 MMOL/L (ref 136–145)
T4 FREE SERPL-MCNC: 1.96 NG/DL (ref 0.93–1.7)
TRIGL SERPL-MCNC: 137 MG/DL (ref 0–150)
TSH SERPL DL<=0.05 MIU/L-ACNC: 0.69 UIU/ML (ref 0.27–4.2)
VLDLC SERPL-MCNC: 27.4 MG/DL (ref 5–40)
WBC NRBC COR # BLD: 8.39 10*3/MM3 (ref 3.4–10.8)

## 2019-09-13 PROCEDURE — 80061 LIPID PANEL: CPT | Performed by: NURSE PRACTITIONER

## 2019-09-13 PROCEDURE — 82306 VITAMIN D 25 HYDROXY: CPT | Performed by: NURSE PRACTITIONER

## 2019-09-13 PROCEDURE — 85027 COMPLETE CBC AUTOMATED: CPT | Performed by: NURSE PRACTITIONER

## 2019-09-13 PROCEDURE — 99213 OFFICE O/P EST LOW 20 MIN: CPT | Performed by: NURSE PRACTITIONER

## 2019-09-13 PROCEDURE — 84439 ASSAY OF FREE THYROXINE: CPT | Performed by: NURSE PRACTITIONER

## 2019-09-13 PROCEDURE — 84443 ASSAY THYROID STIM HORMONE: CPT | Performed by: NURSE PRACTITIONER

## 2019-09-13 PROCEDURE — 80053 COMPREHEN METABOLIC PANEL: CPT | Performed by: NURSE PRACTITIONER

## 2019-09-13 NOTE — PROGRESS NOTES
Chief Complaint   Patient presents with   • Leg Swelling       History of Present Illness    Kanwal Bruner is a 43 y.o. female who presents today for left upper leg swelling first noticed in the last week. She denies injury or trauma to region but reports bug bite 1 week ago to posterior knee with subsequent itchy rash. She has been using triamcinolone cream with some improvement. Feels like only upper leg is swollen or larger but states she may have just gained weight without realizing. She denies change in skin color or temperature, open wound or sores. She has had no new plant or chemical exposures. She does not have chest pain or shortness of breath. No swelling of BL lower legs, ankles, feet or upper extremities.    Patient also requests labs ordered today as she has apt to follow-up with endocrinology next week and would like results for review at that time. She was diagnosed with vitamin d deficiency earlier this year and needs this rechecked as well as she has been unable to tolerate oral vitamin d supplement as has not been taking.     [facility]    The following portions of the patient's history were reviewed and updated as appropriate: allergies, current medications, past family history, past medical history, past social history, past surgical history and problem list.     Social History     Tobacco Use   • Smoking status: Never Smoker   • Smokeless tobacco: Never Used   Substance Use Topics   • Alcohol use: No       Past Medical History:   Diagnosis Date   • Hashimoto's disease    • History of gallstones    • History of ovarian cyst    • Thyroid disease        Past Surgical History:   Procedure Laterality Date   • ADENOIDECTOMY     • BREAST BIOPSY Right 2013    Ultrasound guided   •  SECTION     • CHOLECYSTECTOMY     • HYSTERECTOMY  2015    left ovary left       Allergies   Allergen Reactions   • Peanuts [Peanut Oil] Anaphylaxis   • Sulfa Antibiotics Rash         Current Outpatient  "Medications:   •  SYNTHROID 200 MCG tablet, Take 1 tablet by mouth Daily., Disp: 90 tablet, Rfl: 3  •  Cholecalciferol 05549 units wafer, Take 1 dose by mouth 1 (One) Time Per Week., Disp: 4 wafer, Rfl: 11  •  EPINEPHrine (EPIPEN) 0.3 MG/0.3ML solution auto-injector injection, Inject 0.3 ML into the shoulder, thigh, or buttocks for 1 dose., Disp: 2 each, Rfl: 11    Review of Systems  Review of Systems   Constitutional: Negative for appetite change, chills, diaphoresis, fatigue and fever.   Respiratory: Negative for cough and shortness of breath.    Cardiovascular: Positive for leg swelling. Negative for chest pain and palpitations.   Gastrointestinal: Negative for diarrhea, nausea and vomiting.   Musculoskeletal: Negative for arthralgias and joint swelling.   Skin: Negative for color change, rash and wound.   Allergic/Immunologic: Negative for environmental allergies and food allergies.   Neurological: Negative for dizziness, light-headedness and headaches.   Psychiatric/Behavioral: Negative for sleep disturbance.       Vitals:  Vitals:    09/13/19 1047   BP: 128/84   Pulse: 87   Temp: 98.3 °F (36.8 °C)   TempSrc: Oral   SpO2: 97%   Weight: (!) 148 kg (327 lb)   Height: 161 cm (63.39\")       Physical Exam  Physical Exam   Constitutional: She is oriented to person, place, and time. Vital signs are normal. She appears well-developed and well-nourished.   Neck: Normal range of motion. Neck supple. No JVD present. Carotid bruit is not present.   Cardiovascular: Normal rate, regular rhythm, normal heart sounds and normal pulses.   Pulses:       Radial pulses are 2+ on the right side, and 2+ on the left side.   No edema noted    Pulmonary/Chest: Effort normal and breath sounds normal. No respiratory distress. She has no decreased breath sounds. She has no wheezes. She has no rhonchi. She has no rales.   Neurological: She is alert and oriented to person, place, and time.   Skin: Skin is warm, dry and intact. Rash (left " posterior knee) noted. No abrasion, no bruising, no ecchymosis and no lesion noted. Rash is maculopapular. No erythema.   There is no obvious edema of either upper leg. There is no change in skin, color, temperature or integrity. There is no tenderness to palpation. No open wounds, sores, or lesions. There is no noticeable difference in size of upper legs when compared.   Psychiatric: She has a normal mood and affect. Her behavior is normal.   Nursing note and vitals reviewed.      Labs  Results for orders placed or performed in visit on 09/13/19   TSH   Result Value Ref Range    TSH 0.685 0.270 - 4.200 uIU/mL   T4, Free   Result Value Ref Range    Free T4 1.96 (H) 0.93 - 1.70 ng/dL   Comprehensive Metabolic Panel   Result Value Ref Range    Glucose 103 (H) 65 - 99 mg/dL    BUN 13 6 - 20 mg/dL    Creatinine 0.77 0.57 - 1.00 mg/dL    Sodium 139 136 - 145 mmol/L    Potassium 4.0 3.5 - 5.2 mmol/L    Chloride 101 98 - 107 mmol/L    CO2 24.1 22.0 - 29.0 mmol/L    Calcium 9.0 8.6 - 10.5 mg/dL    Total Protein 7.2 6.0 - 8.5 g/dL    Albumin 4.60 3.50 - 5.20 g/dL    ALT (SGPT) 22 1 - 33 U/L    AST (SGOT) 21 1 - 32 U/L    Alkaline Phosphatase 52 39 - 117 U/L    Total Bilirubin 0.3 0.2 - 1.2 mg/dL    eGFR Non African Amer 81 >60 mL/min/1.73    Globulin 2.6 gm/dL    A/G Ratio 1.8 g/dL    BUN/Creatinine Ratio 16.9 7.0 - 25.0    Anion Gap 13.9 5.0 - 15.0 mmol/L   CBC (No Diff)   Result Value Ref Range    WBC 8.39 3.40 - 10.80 10*3/mm3    RBC 4.91 3.77 - 5.28 10*6/mm3    Hemoglobin 13.8 12.0 - 15.9 g/dL    Hematocrit 43.4 34.0 - 46.6 %    MCV 88.4 79.0 - 97.0 fL    MCH 28.1 26.6 - 33.0 pg    MCHC 31.8 31.5 - 35.7 g/dL    RDW 13.3 12.3 - 15.4 %    RDW-SD 43.0 37.0 - 54.0 fl    MPV 11.0 6.0 - 12.0 fL    Platelets 322 140 - 450 10*3/mm3   Lipid Panel   Result Value Ref Range    Total Cholesterol 161 0 - 200 mg/dL    Triglycerides 137 0 - 150 mg/dL    HDL Cholesterol 36 (L) 40 - 60 mg/dL    LDL Cholesterol  98 0 - 100 mg/dL    VLDL  Cholesterol 27.4 5 - 40 mg/dL    LDL/HDL Ratio 2.71    Vitamin D 25 hydroxy   Result Value Ref Range    25 Hydroxy, Vitamin D 21.7 (L) 30.0 - 100.0 ng/ml       Assessment/Plan    Problem List Items Addressed This Visit        Digestive    Vitamin D deficiency    Relevant Orders    Vitamin D 25 hydroxy (Completed)       Endocrine    Hypothyroidism due to Hashimoto's thyroiditis    Relevant Orders    TSH (Completed)    T4, Free (Completed)    Comprehensive Metabolic Panel (Completed)    CBC (No Diff) (Completed)    Lipid Panel (Completed)      Other Visit Diagnoses     Left leg swelling    -  Primary    Relevant Orders    Comprehensive Metabolic Panel (Completed)    CBC (No Diff) (Completed)      Labs today to evaluate for metabolic disturbance as potential for sensation of edema and heaviness of left leg. Will order endo requested labs as well for follow-up there and for establish care visit with Dr. Haywood scheduled in 3 days. Advised patient to monitor for change in leg size by measuring bilateral leg circumference in same place BID until follow-up with Dr. Haywood and report findings at that time for further evaluation. Encouraged to keep legs elevated when at rest and in regular physical activity to ensure adequate circulation.     Plan of care reviewed with patient at conclusion of today's visit. Patient education was provided regarding diagnosis, management, and prescribed or recommended OTC medications. Patient was informed to notify office of any new, worsening, or persistent symptoms. Patient verbalized understanding and agreement with plan of care.     Follow-Up  Return in about 3 days (around 9/16/2019) for Establish care visit with Dr. Haywood.    Electronically Signed By:  AMARA Phan

## 2019-09-13 NOTE — TELEPHONE ENCOUNTER
Just now retreived this message, patient has already left appointment.  Jess did complete her Vit D levels, but no additional labs were mentions at the time.

## 2019-09-13 NOTE — TELEPHONE ENCOUNTER
PATIENT IS SEEING JORGE KIRK TODAY. SHE WANTS TO SEE IF DR. JUAN WOULD PLACE ORDERS IN CHART TO HAVE BLOOD DRAWN TODAY BEFORE HER APPOINTMENT WITH DR. JUAN WHICH IS Tuesday NEXT WEEK. PATIENT IS CURRENTLY IN OFFICE RIGHT NOW.

## 2019-09-17 ENCOUNTER — OFFICE VISIT (OUTPATIENT)
Dept: ENDOCRINOLOGY | Facility: CLINIC | Age: 44
End: 2019-09-17

## 2019-09-17 VITALS
WEIGHT: 293 LBS | DIASTOLIC BLOOD PRESSURE: 80 MMHG | HEART RATE: 77 BPM | OXYGEN SATURATION: 99 % | SYSTOLIC BLOOD PRESSURE: 122 MMHG | HEIGHT: 63 IN | BODY MASS INDEX: 51.91 KG/M2

## 2019-09-17 DIAGNOSIS — E28.2 PCOS (POLYCYSTIC OVARIAN SYNDROME): ICD-10-CM

## 2019-09-17 DIAGNOSIS — E03.8 HYPOTHYROIDISM DUE TO HASHIMOTO'S THYROIDITIS: Primary | ICD-10-CM

## 2019-09-17 DIAGNOSIS — E06.3 HYPOTHYROIDISM DUE TO HASHIMOTO'S THYROIDITIS: Primary | ICD-10-CM

## 2019-09-17 DIAGNOSIS — E55.9 VITAMIN D DEFICIENCY: ICD-10-CM

## 2019-09-17 PROCEDURE — 99214 OFFICE O/P EST MOD 30 MIN: CPT | Performed by: INTERNAL MEDICINE

## 2019-09-17 RX ORDER — EPINEPHRINE 0.3 MG/.3ML
INJECTION SUBCUTANEOUS
Qty: 2 EACH | Refills: 11 | Status: SHIPPED | OUTPATIENT
Start: 2019-09-17

## 2019-09-17 NOTE — PROGRESS NOTES
Chief complaint  Hypothyroidism (Follow UP) and Vitamin D Deficiency    Subjective   aKnwal Bruner is a 43 y.o. female is here today for follow-up.   PCOS - Clinical features include oligomenorrhea and insulin resistance.   Metformin caused diarrhea and heart palpitations. She stopped medication and diarrhea resolved.    Symptoms: acne, skin darkening, weight gain. She is following the diet     Hypothyroidism. The patient has Hashimoto's thyroiditis dx 2004. Current treatment includes levothyroxine (Synthroid)  225 mcg daily.  09/2018 we have reduced the dose to 225 mcg daily.      Vit D deficiency  She stopped Vit D supplements because of diarrhea.  Her last visit Vit D was low. Trial of Replesta didn't cause side effects.     Prediabetes with A1C of 5.9 in 09/2018. She is exercising - water aerobics, follows the diet.     She c/o hair loss and fatigue.      Medications    Current Outpatient Medications:   •  EPINEPHrine (EPIPEN) 0.3 MG/0.3ML solution auto-injector injection, Inject 0.3 ML into the shoulder, thigh, or buttocks for 1 dose., Disp: 2 each, Rfl: 11  •  SYNTHROID 200 MCG tablet, Take 1 tablet by mouth Daily., Disp: 90 tablet, Rfl: 3  •  Cholecalciferol 61370 units wafer, Take 1 dose by mouth 1 (One) Time Per Week., Disp: 4 wafer, Rfl: 11    PMH  The following portions of the patient's history were reviewed and updated as appropriate: allergies, current medications, past family history, past medical history, past social history, past surgical history and problem list.    Review of systems  Review of Systems   Constitutional: Positive for fatigue and unexpected weight change (weight gain). Negative for activity change, appetite change, chills and diaphoresis.   HENT: Negative for congestion, ear pain, facial swelling, hearing loss, postnasal drip, sore throat, trouble swallowing and voice change.    Eyes: Negative.  Negative for redness, itching and visual disturbance.   Respiratory: Negative for  "cough and shortness of breath.    Cardiovascular: Negative for chest pain, palpitations and leg swelling.   Gastrointestinal: Negative for abdominal distention, abdominal pain, constipation, diarrhea, nausea and vomiting.   Endocrine:        As listed in HPI   Genitourinary: Negative.    Musculoskeletal: Negative for arthralgias, back pain, joint swelling, myalgias, neck pain and neck stiffness.   Skin: Negative.         Hair loss   Allergic/Immunologic: Negative.    Neurological: Positive for weakness. Negative for dizziness, tremors, syncope, light-headedness and headaches.   Hematological: Negative.    Psychiatric/Behavioral: Negative.    All other systems reviewed and are negative.      Physical exam  Objective   Blood pressure 122/80, pulse 77, height 161 cm (63.39\"), weight (!) 146 kg (322 lb), SpO2 99 %, not currently breastfeeding.   Physical Exam   Constitutional: She is oriented to person, place, and time. She appears well-developed and well-nourished.   Morbidly obese   HENT:   Head: Normocephalic and atraumatic.   Eyes: Conjunctivae are normal.   Neck: No thyromegaly present.   The neck is supple and symmetric   Cardiovascular: Normal rate, regular rhythm and normal heart sounds.   Pulmonary/Chest: Effort normal and breath sounds normal.   Musculoskeletal: She exhibits no edema.   Lymphadenopathy:     She has no cervical adenopathy.   Neurological: She is alert and oriented to person, place, and time.   Skin: Skin is warm and dry. No rash noted.   Psychiatric: She has a normal mood and affect. Thought content normal.   Vitals reviewed.        Assessment  Assessment/Plan   Problem List Items Addressed This Visit        Digestive    Vitamin D deficiency       Endocrine    Hypothyroidism due to Hashimoto's thyroiditis - Primary    PCOS (polycystic ovarian syndrome)          Plan  -Synthroid 225--> 200 mcg. Repeat labs showed high thyroid function with free T4 of 1.92 and TSH of 0.6. She receives the med " from the [facility] pharmacy     -Vit D deficiency - start Replesta - 50 000 IU once, then 60755 IU weekly.      -repeat labs before the next visit      Follow-up in 4-6 months.

## 2019-09-25 ENCOUNTER — OFFICE VISIT (OUTPATIENT)
Dept: INTERNAL MEDICINE | Facility: CLINIC | Age: 44
End: 2019-09-25

## 2019-09-25 VITALS
RESPIRATION RATE: 16 BRPM | DIASTOLIC BLOOD PRESSURE: 74 MMHG | SYSTOLIC BLOOD PRESSURE: 122 MMHG | WEIGHT: 293 LBS | HEART RATE: 91 BPM | HEIGHT: 63 IN | BODY MASS INDEX: 51.91 KG/M2 | OXYGEN SATURATION: 99 %

## 2019-09-25 DIAGNOSIS — E78.5 HYPERLIPIDEMIA, UNSPECIFIED HYPERLIPIDEMIA TYPE: ICD-10-CM

## 2019-09-25 DIAGNOSIS — E55.9 VITAMIN D DEFICIENCY: ICD-10-CM

## 2019-09-25 DIAGNOSIS — E66.01 MORBID OBESITY (HCC): ICD-10-CM

## 2019-09-25 DIAGNOSIS — Z23 NEED FOR VACCINATION: ICD-10-CM

## 2019-09-25 DIAGNOSIS — E06.3 HYPOTHYROIDISM DUE TO HASHIMOTO'S THYROIDITIS: ICD-10-CM

## 2019-09-25 DIAGNOSIS — R73.03 PREDIABETES: ICD-10-CM

## 2019-09-25 DIAGNOSIS — E03.8 HYPOTHYROIDISM DUE TO HASHIMOTO'S THYROIDITIS: ICD-10-CM

## 2019-09-25 DIAGNOSIS — R79.89 ABNORMAL LIVER FUNCTION TEST: ICD-10-CM

## 2019-09-25 DIAGNOSIS — Z15.89 MTHFR GENE MUTATION: Primary | ICD-10-CM

## 2019-09-25 DIAGNOSIS — E28.2 PCOS (POLYCYSTIC OVARIAN SYNDROME): ICD-10-CM

## 2019-09-25 PROBLEM — R31.29 MICROSCOPIC HEMATURIA: Status: RESOLVED | Noted: 2017-11-08 | Resolved: 2019-09-25

## 2019-09-25 PROBLEM — R03.0 TEMPORARY HIGH BLOOD PRESSURE: Status: RESOLVED | Noted: 2017-11-08 | Resolved: 2019-09-25

## 2019-09-25 PROCEDURE — 90471 IMMUNIZATION ADMIN: CPT | Performed by: INTERNAL MEDICINE

## 2019-09-25 PROCEDURE — 99204 OFFICE O/P NEW MOD 45 MIN: CPT | Performed by: INTERNAL MEDICINE

## 2019-09-25 PROCEDURE — 90686 IIV4 VACC NO PRSV 0.5 ML IM: CPT | Performed by: INTERNAL MEDICINE

## 2019-09-25 NOTE — PROGRESS NOTES
"Internal Medicine New Patient  Kanwal Bruner is a 43 y.o. female who presents today to establish care and with concerns as outlined below.    Chief Complaint  Chief Complaint   Patient presents with   • Establish Care        HPI  Ms. Bruner is a 43y F with prediabetes, PCOS, hypothyroidism due to Hashimotos thyroiditis, vitamin D deficiency,    She recently had levothyroxine decreased from 225mcg to 200mcg by Dr. Gonzalez. She takes this at night to avoid nausea. She takes it on an empty stomach. Avoids eating late.    Recently resumed vitamin D supplementation with 89862 units weekly x2 and is starting 98533 units weekly as soon as she can pick them up from the pharmacy. She has had issues with nausea with vitamin D supplementation in the past. Describes herself as having a sensitive stomach.    Has PCOS and prediabetes. Had been on metformin but developed diarrhea and felt like she was going to \"have a heart attack\" prompting her to call 911. This happened on two occasions. So she discontinued it.    Has an epi pen for her peanut allergy.    Denies history of HTN but has had higher BP due to certain situations. Also notes high-normal BP when OCPs years ago.    Had hysterectomy previously for possible cancer after concerns noted on endometrial biopsy but saw oncologist who performed laparascopic hysterectomy with complex atypical hyperplasia. No longer has cervix. Saw OB[facility]N at [facility] last year for annual. Will see Dr. Kimble in January. Mammogram due 12/21/2019.         Review of Systems  Review of Systems   Constitutional: Negative for chills, diaphoresis, fatigue, fever, unexpected weight gain and unexpected weight loss.   HENT: Positive for hearing loss (hearing aids). Negative for congestion, ear pain, mouth sores, rhinorrhea, sore throat and trouble swallowing.    Eyes: Positive for redness. Negative for blurred vision, double vision, pain and visual disturbance.   Respiratory: " Negative for cough, chest tightness, shortness of breath and wheezing.    Cardiovascular: Negative for chest pain, palpitations and leg swelling.   Gastrointestinal: Negative for abdominal pain, blood in stool, constipation, diarrhea, nausea, vomiting, GERD and indigestion.   Endocrine: Positive for heat intolerance.   Genitourinary: Negative for breast lump, breast pain, decreased urine volume, difficulty urinating, dysuria, frequency, hematuria, menstrual problem, urgency, urinary incontinence and vaginal bleeding.   Musculoskeletal: Negative for arthralgias, gait problem, joint swelling and myalgias.   Skin: Negative for rash and skin lesions.   Neurological: Negative for dizziness, syncope, facial asymmetry, weakness, light-headedness, numbness and headache.   Psychiatric/Behavioral: Negative for decreased concentration, self-injury, sleep disturbance, suicidal ideas, depressed mood and stress. The patient is not nervous/anxious.         Past Medical History  Past Medical History:   Diagnosis Date   • Abnormal liver function test 2016   • Acute bronchitis 10/11/2016   • Bell's palsy    • GERD (gastroesophageal reflux disease)    • Hashimoto's disease    • History of gallstones    • History of ovarian cyst    • Lactose intolerance    • Microscopic hematuria 2017   • Temporary high blood pressure 2017   • Thyroid disease         Surgical History  Past Surgical History:   Procedure Laterality Date   • ADENOIDECTOMY     • BREAST BIOPSY Right 2013    Ultrasound guided   •  SECTION      2009 & 2005   • CHOLECYSTECTOMY     • HYSTERECTOMY  2015   • OOPHORECTOMY Right    • TYMPANOSTOMY TUBE PLACEMENT          Family History  Family History   Problem Relation Age of Onset   • Migraines Mother    • Hypertension Mother    • Hyperlipidemia Father    • Obesity Father    • Prostate cancer Father 42        passed at 47    • Hypertension Father    • Heart attack Maternal Grandmother    •  "Migraines Maternal Grandmother    • Stroke Paternal Grandfather    • Thyroid disease Cousin    • Breast cancer Maternal Aunt 55        Passed away 2019, BRCA testing negative   • Hypertension Half-Sister    • Diabetes Half-Sister    • Other Half-Sister         abnormal pap, not cancer   • Hypothyroidism Maternal Aunt         Hashimotos   • Hypothyroidism Maternal Aunt    • Clotting disorder Maternal Aunt    • Lung cancer Maternal Uncle    • Lung cancer Maternal Aunt    • Ovarian cancer Neg Hx         Social History  Social History     Socioeconomic History   • Marital status:      Spouse name: Not on file   • Number of children: Not on file   • Years of education: Not on file   • Highest education level: Not on file   Tobacco Use   • Smoking status: Never Smoker   • Smokeless tobacco: Never Used   Substance and Sexual Activity   • Alcohol use: No        Current Medications  Current Outpatient Medications on File Prior to Visit   Medication Sig Dispense Refill   • Cholecalciferol 22748 units wafer Take 1 dose by mouth 1 (One) Time Per Week. 4 wafer 11   • EPINEPHrine (EPIPEN) 0.3 MG/0.3ML solution auto-injector injection Inject 0.3 ML into the shoulder, thigh, or buttocks for 1 dose. 2 each 11   • SYNTHROID 200 MCG tablet Take 1 tablet by mouth Daily. 90 tablet 3     No current facility-administered medications on file prior to visit.        Allergies  Allergies   Allergen Reactions   • Adhesive Tape Anaphylaxis and Rash     Allergic to some adhesives but not others. Has had rash to some, anaphylaxis to adhesive used with eyelash extension.   • Peanuts [Peanut Oil] Anaphylaxis   • Sulfa Antibiotics Rash        Objective  Visit Vitals  /74   Pulse 91   Resp 16   Ht 161 cm (63.39\")   Wt (!) 150 kg (331 lb)   SpO2 99%   BMI 57.92 kg/m²        Physical Exam  Physical Exam   Constitutional: She is oriented to person, place, and time. She appears well-developed and well-nourished. No distress. She is morbidly " obese.  HENT:   Head: Normocephalic and atraumatic.   Right Ear: External ear normal.   Left Ear: External ear normal.   Nose: Nose normal.   Mouth/Throat: Oropharynx is clear and moist and mucous membranes are normal. No oropharyngeal exudate, posterior oropharyngeal edema or posterior oropharyngeal erythema. Tonsils are 2+ on the right. Tonsils are 2+ on the left. No tonsillar exudate.   Eyes: Conjunctivae and EOM are normal. Pupils are equal, round, and reactive to light. No scleral icterus.   Neck: Neck supple.   Cardiovascular: Normal rate, regular rhythm, normal heart sounds and intact distal pulses.   No murmur heard.  Pulmonary/Chest: Effort normal and breath sounds normal. No respiratory distress.   Abdominal: Soft. Bowel sounds are normal. She exhibits no distension. There is no tenderness.   Musculoskeletal: She exhibits no edema or deformity.   Lymphadenopathy:     She has no cervical adenopathy.   Neurological: She is alert and oriented to person, place, and time.   Skin: Skin is warm and dry. No rash noted. She is not diaphoretic.   Psychiatric: She has a normal mood and affect. Her behavior is normal. Judgment and thought content normal.   Nursing note and vitals reviewed.       Results  Results for orders placed or performed in visit on 09/13/19   TSH   Result Value Ref Range    TSH 0.685 0.270 - 4.200 uIU/mL   T4, Free   Result Value Ref Range    Free T4 1.96 (H) 0.93 - 1.70 ng/dL   Comprehensive Metabolic Panel   Result Value Ref Range    Glucose 103 (H) 65 - 99 mg/dL    BUN 13 6 - 20 mg/dL    Creatinine 0.77 0.57 - 1.00 mg/dL    Sodium 139 136 - 145 mmol/L    Potassium 4.0 3.5 - 5.2 mmol/L    Chloride 101 98 - 107 mmol/L    CO2 24.1 22.0 - 29.0 mmol/L    Calcium 9.0 8.6 - 10.5 mg/dL    Total Protein 7.2 6.0 - 8.5 g/dL    Albumin 4.60 3.50 - 5.20 g/dL    ALT (SGPT) 22 1 - 33 U/L    AST (SGOT) 21 1 - 32 U/L    Alkaline Phosphatase 52 39 - 117 U/L    Total Bilirubin 0.3 0.2 - 1.2 mg/dL    eGFR Non   Amer 81 >60 mL/min/1.73    Globulin 2.6 gm/dL    A/G Ratio 1.8 g/dL    BUN/Creatinine Ratio 16.9 7.0 - 25.0    Anion Gap 13.9 5.0 - 15.0 mmol/L   CBC (No Diff)   Result Value Ref Range    WBC 8.39 3.40 - 10.80 10*3/mm3    RBC 4.91 3.77 - 5.28 10*6/mm3    Hemoglobin 13.8 12.0 - 15.9 g/dL    Hematocrit 43.4 34.0 - 46.6 %    MCV 88.4 79.0 - 97.0 fL    MCH 28.1 26.6 - 33.0 pg    MCHC 31.8 31.5 - 35.7 g/dL    RDW 13.3 12.3 - 15.4 %    RDW-SD 43.0 37.0 - 54.0 fl    MPV 11.0 6.0 - 12.0 fL    Platelets 322 140 - 450 10*3/mm3   Lipid Panel   Result Value Ref Range    Total Cholesterol 161 0 - 200 mg/dL    Triglycerides 137 0 - 150 mg/dL    HDL Cholesterol 36 (L) 40 - 60 mg/dL    LDL Cholesterol  98 0 - 100 mg/dL    VLDL Cholesterol 27.4 5 - 40 mg/dL    LDL/HDL Ratio 2.71    Vitamin D 25 hydroxy   Result Value Ref Range    25 Hydroxy, Vitamin D 21.7 (L) 30.0 - 100.0 ng/ml        Assessment and Plan  Kanwal was seen today for establish care.    Diagnoses and all orders for this visit:    MTHFR gene mutation (CMS/HCC)  - Noted to have be heterozygous for MTHFR gene mutation, has family history of aunt and cousin with same mutation leading to miscarriage and clots  - Based upon her mutation will check fasting homocysteine level to determine clinical significance    Hypothyroidism due to Hashimoto's thyroiditis  - Follows with Dr. Gonzalez  - Recently had dose of levothyroxine decreased to 200mcg from 225mcg due to high free T4    PCOS (polycystic ovarian syndrome)  - Clinically has several metabolic features of PCOS including insulin resistance, prior HLD, obesity  - s/p hysterectomy so no longer having menstrual irregularity  - Intolerant of metformin in the past    Vitamin D deficiency  - Managed by Dr. Gonzalez, level 21.7 on recent labs  - completed 76322r weekly x2, now starting 69898 units weekly    Prediabetes  - Most recent A1c 5.5% in 3/2019, metformin intolerance as above  - Repeat A1c at next visit    Abnormal  liver function test  - Most recent CMP with normal transaminases    Morbid obesity (CMS/HCC)  - BMI 57.9  - Will discuss weight loss next visit    Hyperlipidemia, unspecified hyperlipidemia type  - ASCVD risk 1% based on most recent lipid panel, continue dietary modifications.    Need for vaccination  -     Flucelvax Quad=>4Years ([id number])        Health Maintenance   Topic Date Due   • ANNUAL PHYSICAL  10/14/1976   • TDAP/TD VACCINES (1 - Tdap) 10/14/1992   • LIPID PANEL  09/13/2020   • INFLUENZA VACCINE  Completed   • PAP SMEAR  Discontinued     Health Maintenance  - Pap smear: s/p hysterectomy, benign pathology.  - Mammogram: 12/2018 BIRADS 2, she will repeat 12/2019.  - Colonoscopy: Indicated at age 48.  - Immunizations: Flu today. Reports Tdap ~5y ago with significant local response so hesitant to repeat. Hep A x1 11/2/2018, will get #2 at local pharmacy in 1-2 weeks.  - Depression screening: PHQ2 negative 9/2019.    Return in about 6 months (around 3/25/2020) for Follow up.

## 2019-09-27 ENCOUNTER — LAB (OUTPATIENT)
Dept: LAB | Facility: HOSPITAL | Age: 44
End: 2019-09-27

## 2019-09-27 DIAGNOSIS — Z15.89 MTHFR GENE MUTATION: ICD-10-CM

## 2019-09-27 DIAGNOSIS — E06.3 HYPOTHYROIDISM DUE TO HASHIMOTO'S THYROIDITIS: ICD-10-CM

## 2019-09-27 DIAGNOSIS — E55.9 VITAMIN D DEFICIENCY: ICD-10-CM

## 2019-09-27 DIAGNOSIS — E03.8 HYPOTHYROIDISM DUE TO HASHIMOTO'S THYROIDITIS: ICD-10-CM

## 2019-09-27 LAB — HCYS SERPL-MCNC: 9.4 UMOL/L (ref 0–15)

## 2019-09-27 PROCEDURE — 83090 ASSAY OF HOMOCYSTEINE: CPT

## 2019-09-27 PROCEDURE — 36415 COLL VENOUS BLD VENIPUNCTURE: CPT

## 2019-12-17 ENCOUNTER — LAB (OUTPATIENT)
Dept: LAB | Facility: HOSPITAL | Age: 44
End: 2019-12-17

## 2019-12-17 DIAGNOSIS — Z15.89 MTHFR GENE MUTATION: ICD-10-CM

## 2019-12-17 DIAGNOSIS — E55.9 VITAMIN D DEFICIENCY: ICD-10-CM

## 2019-12-17 DIAGNOSIS — E06.3 HYPOTHYROIDISM DUE TO HASHIMOTO'S THYROIDITIS: ICD-10-CM

## 2019-12-17 DIAGNOSIS — E03.8 HYPOTHYROIDISM DUE TO HASHIMOTO'S THYROIDITIS: ICD-10-CM

## 2019-12-17 LAB
25(OH)D3 SERPL-MCNC: 33.5 NG/ML (ref 30–100)
T4 FREE SERPL-MCNC: 1.38 NG/DL (ref 0.93–1.7)
TSH SERPL DL<=0.05 MIU/L-ACNC: 9.32 UIU/ML (ref 0.27–4.2)

## 2019-12-17 PROCEDURE — 84443 ASSAY THYROID STIM HORMONE: CPT

## 2019-12-17 PROCEDURE — 84439 ASSAY OF FREE THYROXINE: CPT

## 2019-12-17 PROCEDURE — 36415 COLL VENOUS BLD VENIPUNCTURE: CPT

## 2019-12-17 PROCEDURE — 82306 VITAMIN D 25 HYDROXY: CPT

## 2019-12-26 DIAGNOSIS — E03.8 HYPOTHYROIDISM DUE TO HASHIMOTO'S THYROIDITIS: ICD-10-CM

## 2019-12-26 DIAGNOSIS — E06.3 HYPOTHYROIDISM DUE TO HASHIMOTO'S THYROIDITIS: ICD-10-CM

## 2019-12-26 RX ORDER — LEVOTHYROXINE SODIUM 25 MCG
25 TABLET ORAL DAILY
Qty: 90 TABLET | Refills: 3 | Status: SHIPPED | OUTPATIENT
Start: 2019-12-26 | End: 2020-03-05 | Stop reason: ALTCHOICE

## 2019-12-26 RX ORDER — LEVOTHYROXINE SODIUM 200 MCG
200 TABLET ORAL DAILY
Qty: 90 TABLET | Refills: 3 | Status: SHIPPED | OUTPATIENT
Start: 2019-12-26 | End: 2020-02-24 | Stop reason: SDUPTHER

## 2020-01-13 ENCOUNTER — TRANSCRIBE ORDERS (OUTPATIENT)
Dept: ADMINISTRATIVE | Facility: HOSPITAL | Age: 45
End: 2020-01-13

## 2020-01-13 DIAGNOSIS — Z12.31 VISIT FOR SCREENING MAMMOGRAM: Primary | ICD-10-CM

## 2020-01-14 ENCOUNTER — HOSPITAL ENCOUNTER (OUTPATIENT)
Dept: MAMMOGRAPHY | Facility: HOSPITAL | Age: 45
Discharge: HOME | End: 2020-01-14
Admitting: OBSTETRICS & GYNECOLOGY

## 2020-01-14 DIAGNOSIS — Z12.31 VISIT FOR SCREENING MAMMOGRAM: ICD-10-CM

## 2020-01-14 PROCEDURE — 77063 BREAST TOMOSYNTHESIS BI: CPT

## 2020-01-14 PROCEDURE — 77067 SCR MAMMO BI INCL CAD: CPT | Performed by: RADIOLOGY

## 2020-01-14 PROCEDURE — 77067 SCR MAMMO BI INCL CAD: CPT

## 2020-01-14 PROCEDURE — 77063 BREAST TOMOSYNTHESIS BI: CPT | Performed by: RADIOLOGY

## 2020-02-24 ENCOUNTER — TELEPHONE (OUTPATIENT)
Dept: ENDOCRINOLOGY | Facility: CLINIC | Age: 45
End: 2020-02-24

## 2020-02-24 DIAGNOSIS — E06.3 HYPOTHYROIDISM DUE TO HASHIMOTO'S THYROIDITIS: ICD-10-CM

## 2020-02-24 DIAGNOSIS — E03.8 HYPOTHYROIDISM DUE TO HASHIMOTO'S THYROIDITIS: ICD-10-CM

## 2020-02-24 RX ORDER — LEVOTHYROXINE SODIUM 200 MCG
200 TABLET ORAL DAILY
Qty: 90 TABLET | Refills: 3 | Status: SHIPPED | OUTPATIENT
Start: 2020-02-24 | End: 2020-03-05 | Stop reason: SDUPTHER

## 2020-02-24 NOTE — TELEPHONE ENCOUNTER
PATIENT CALLED TO REQUEST REFILL OF SYNTHROID 200 MCG, PLEASE SEND TO [facility].

## 2020-03-05 DIAGNOSIS — E06.3 HYPOTHYROIDISM DUE TO HASHIMOTO'S THYROIDITIS: ICD-10-CM

## 2020-03-05 DIAGNOSIS — E03.8 HYPOTHYROIDISM DUE TO HASHIMOTO'S THYROIDITIS: ICD-10-CM

## 2020-03-05 RX ORDER — LEVOTHYROXINE SODIUM 25 MCG
25 TABLET ORAL DAILY
Qty: 90 TABLET | Refills: 3 | COMMUNITY
Start: 2020-03-05 | End: 2020-09-02 | Stop reason: SDUPTHER

## 2020-03-05 RX ORDER — LEVOTHYROXINE SODIUM 200 MCG
200 TABLET ORAL DAILY
Qty: 90 TABLET | Refills: 3 | Status: SHIPPED | OUTPATIENT
Start: 2020-03-05 | End: 2020-05-06

## 2020-03-18 ENCOUNTER — TELEPHONE (OUTPATIENT)
Dept: ENDOCRINOLOGY | Facility: CLINIC | Age: 45
End: 2020-03-18

## 2020-03-18 NOTE — TELEPHONE ENCOUNTER
PATIENT WOULD LIKE TO KNOW IF HASHIMOTO'S IS CONSIDERED TO BE IMMUNOSUPPRESSED. IF SO, SHE WOULD LIKE TO KNOW IF DR JUAN CAN PROVIDE A LETTER THAT WOULD ALLOW HER TO SHOP AT DIFFERENT HOURS AWAY FROM GENERAL POPULATION.     CALL BACK [phone]

## 2020-03-19 ENCOUNTER — TELEPHONE (OUTPATIENT)
Dept: ENDOCRINOLOGY | Facility: CLINIC | Age: 45
End: 2020-03-19

## 2020-03-19 NOTE — TELEPHONE ENCOUNTER
PHARMACY IS CALLING NEEDING CLARIFICATION ON PATIENTS SYNTHROID MEDICATION. THEY NEED CLARIFICATION ON THE DOSAGE AND TOTAL DAILY DOSE FOR THIS MEDICATION. PHARMACY NUMBER [phone]

## 2020-03-19 NOTE — TELEPHONE ENCOUNTER
Hashimoto is autoimmune disease but it doesn't not put you in immunocompromised status. Please follow general precautions and social distancing.

## 2020-03-19 NOTE — TELEPHONE ENCOUNTER
Spoke with pharmacy concerning medication dosage for patients synthroid . Spoke with Paula concerning this she has reached out to Dr. Gonzalez on correct dosage.

## 2020-05-06 DIAGNOSIS — E03.8 HYPOTHYROIDISM DUE TO HASHIMOTO'S THYROIDITIS: ICD-10-CM

## 2020-05-06 DIAGNOSIS — E06.3 HYPOTHYROIDISM DUE TO HASHIMOTO'S THYROIDITIS: ICD-10-CM

## 2020-05-06 RX ORDER — LEVOTHYROXINE SODIUM 200 MCG
200 TABLET ORAL DAILY
Qty: 90 TABLET | Refills: 3 | Status: SHIPPED | OUTPATIENT
Start: 2020-05-06 | End: 2020-09-02 | Stop reason: SDUPTHER

## 2020-06-03 ENCOUNTER — TELEPHONE (OUTPATIENT)
Dept: INTERNAL MEDICINE | Facility: CLINIC | Age: 45
End: 2020-06-03

## 2020-06-03 ENCOUNTER — TELEMEDICINE (OUTPATIENT)
Dept: INTERNAL MEDICINE | Facility: CLINIC | Age: 45
End: 2020-06-03

## 2020-06-03 DIAGNOSIS — J01.80 ACUTE NON-RECURRENT SINUSITIS OF OTHER SINUS: Primary | ICD-10-CM

## 2020-06-03 PROCEDURE — 99213 OFFICE O/P EST LOW 20 MIN: CPT | Performed by: INTERNAL MEDICINE

## 2020-06-03 PROCEDURE — U0003 INFECTIOUS AGENT DETECTION BY NUCLEIC ACID (DNA OR RNA); SEVERE ACUTE RESPIRATORY SYNDROME CORONAVIRUS 2 (SARS-COV-2) (CORONAVIRUS DISEASE [COVID-19]), AMPLIFIED PROBE TECHNIQUE, MAKING USE OF HIGH THROUGHPUT TECHNOLOGIES AS DESCRIBED BY CMS-2020-01-R: HCPCS | Performed by: INTERNAL MEDICINE

## 2020-06-03 RX ORDER — LEVOCETIRIZINE DIHYDROCHLORIDE 5 MG/1
1 TABLET, FILM COATED ORAL DAILY
COMMUNITY
Start: 2020-05-21

## 2020-06-03 RX ORDER — AMOXICILLIN AND CLAVULANATE POTASSIUM 875; 125 MG/1; MG/1
1 TABLET, FILM COATED ORAL 2 TIMES DAILY
Qty: 14 TABLET | Refills: 0 | Status: SHIPPED | OUTPATIENT
Start: 2020-06-03 | End: 2020-09-02

## 2020-06-03 NOTE — TELEPHONE ENCOUNTER
Please contact her pharmacy and notify them that the patient's allergies were verified today to include adhesive tape, peanuts, and sulfa drugs. She reported previously tolerating augmentin. They can verify again with her as well but at this time no change to the medication.

## 2020-06-03 NOTE — TELEPHONE ENCOUNTER
PHARMACY CALLED STATING THAT THEY RECEIVED A PRESCRIPTION FOR  amoxicillin-clavulanate (Augmentin) 875-125 MG per tablet/  THEY HAVE IN THEIR RECORDS THEY HAVE THAT SHE IS ALLERGIC TO PENICILLIN.    PHARMACY CONTACT [phone]

## 2020-07-01 ENCOUNTER — LAB (OUTPATIENT)
Dept: LAB | Facility: HOSPITAL | Age: 45
End: 2020-07-01

## 2020-07-01 LAB
T4 FREE SERPL-MCNC: 1.55 NG/DL (ref 0.93–1.7)
TSH SERPL DL<=0.05 MIU/L-ACNC: 1.8 UIU/ML (ref 0.27–4.2)

## 2020-07-01 PROCEDURE — 84443 ASSAY THYROID STIM HORMONE: CPT | Performed by: INTERNAL MEDICINE

## 2020-07-01 PROCEDURE — 84439 ASSAY OF FREE THYROXINE: CPT | Performed by: INTERNAL MEDICINE

## 2020-07-01 PROCEDURE — 36415 COLL VENOUS BLD VENIPUNCTURE: CPT | Performed by: INTERNAL MEDICINE

## 2020-09-02 ENCOUNTER — TELEMEDICINE (OUTPATIENT)
Dept: ENDOCRINOLOGY | Facility: CLINIC | Age: 45
End: 2020-09-02

## 2020-09-02 DIAGNOSIS — E55.9 VITAMIN D DEFICIENCY: Primary | ICD-10-CM

## 2020-09-02 DIAGNOSIS — E06.3 HYPOTHYROIDISM DUE TO HASHIMOTO'S THYROIDITIS: ICD-10-CM

## 2020-09-02 DIAGNOSIS — E03.8 HYPOTHYROIDISM DUE TO HASHIMOTO'S THYROIDITIS: ICD-10-CM

## 2020-09-02 PROCEDURE — 99213 OFFICE O/P EST LOW 20 MIN: CPT | Performed by: INTERNAL MEDICINE

## 2020-09-02 RX ORDER — LEVOTHYROXINE SODIUM 200 MCG
200 TABLET ORAL DAILY
Qty: 30 TABLET | Refills: 3 | Status: SHIPPED | OUTPATIENT
Start: 2020-09-02 | End: 2020-09-03 | Stop reason: SDUPTHER

## 2020-09-02 RX ORDER — LEVOTHYROXINE SODIUM 25 MCG
25 TABLET ORAL DAILY
Qty: 90 TABLET | Refills: 3 | COMMUNITY
Start: 2020-09-02 | End: 2020-09-02 | Stop reason: SDUPTHER

## 2020-09-02 RX ORDER — LEVOTHYROXINE SODIUM 200 MCG
200 TABLET ORAL DAILY
Qty: 90 TABLET | Refills: 3 | Status: SHIPPED | OUTPATIENT
Start: 2020-09-02 | End: 2020-09-02 | Stop reason: SDUPTHER

## 2020-09-02 RX ORDER — LEVOTHYROXINE SODIUM 25 MCG
25 TABLET ORAL DAILY
Qty: 30 TABLET | Refills: 3 | Status: SHIPPED | OUTPATIENT
Start: 2020-09-02 | End: 2020-09-03 | Stop reason: SDUPTHER

## 2020-09-02 NOTE — PROGRESS NOTES
Chief complaint  hypothyroidism  Telehealth follow-up visit: This was an audio and video enabled telemedicine encounter.  Verbal consent given.  Done for Covid 19 mitigation per CDC guidelines.       Subjective   Kanwal Bruner is a 44 y.o. female is here today for follow-up.   PCOS - Clinical features include oligomenorrhea and insulin resistance.   Metformin caused diarrhea and heart palpitations. She stopped medication and diarrhea resolved.    Symptoms: acne, skin darkening, weight gain. She is following the diet     Hypothyroidism. The patient has Hashimoto's thyroiditis dx 2004. Current treatment includes levothyroxine (Synthroid brand)  225 mcg daily.     Vit D deficiency  She stopped Vit D supplements because of diarrhea.  Her last visit Vit D was low. Trial of Replesta didn't cause side effects. She now found over the counter Vit D supplement and takes 800 IU daily.     She c/o fatigue.  Occasionally forgets to take medication.     Medications    Current Outpatient Medications:   •  SYNTHROID 200 MCG tablet, TAKE 1 TABLET BY MOUTH DAILY, Disp: 90 tablet, Rfl: 3  •  SYNTHROID 25 MCG tablet, Take 1 tablet by mouth Daily. MEL, Disp: 90 tablet, Rfl: 3  •  EPINEPHrine (EPIPEN) 0.3 MG/0.3ML solution auto-injector injection, Inject 0.3 ML into the shoulder, thigh, or buttocks for 1 dose., Disp: 2 each, Rfl: 11  •  levocetirizine (Xyzal Allergy 24HR) 5 MG tablet, Take 1 tablet by mouth Daily., Disp: , Rfl:     PMH  The following portions of the patient's history were reviewed and updated as appropriate: allergies, current medications, past family history, past medical history, past social history, past surgical history and problem list.    Review of systems  Review of Systems   Constitutional: Positive for fatigue and unexpected weight change (weight gain). Negative for activity change, appetite change, chills and diaphoresis.   HENT: Negative for congestion, ear pain, facial swelling, hearing loss, postnasal  drip, sore throat, trouble swallowing and voice change.    Eyes: Negative.  Negative for redness, itching and visual disturbance.   Respiratory: Negative for cough and shortness of breath.    Cardiovascular: Negative for chest pain, palpitations and leg swelling.   Gastrointestinal: Negative for abdominal distention, abdominal pain, constipation, diarrhea, nausea and vomiting.   Endocrine:        As listed in HPI   Genitourinary: Negative.    Musculoskeletal: Negative for arthralgias, back pain, joint swelling, myalgias, neck pain and neck stiffness.   Skin: Negative.         Hair loss   Allergic/Immunologic: Negative.    Neurological: Positive for weakness. Negative for dizziness, tremors, syncope, light-headedness and headaches.   Hematological: Negative.    Psychiatric/Behavioral: Negative.    All other systems reviewed and are negative.      Physical exam  Objective   not currently breastfeeding.   Physical Exam   Constitutional: She is oriented to person, place, and time. She appears well-developed and well-nourished.   No facial swelling     HENT:   Head: Normocephalic and atraumatic.   Eyes: Conjunctivae are normal.   Neck: No thyroid mass present.   Pulmonary/Chest: No respiratory distress.   Neurological: She is alert and oriented to person, place, and time.   Psychiatric: She has a normal mood and affect. Thought content normal.   Vitals reviewed.        Assessment  Assessment/Plan   Problem List Items Addressed This Visit        Digestive    Vitamin D deficiency - Primary    Relevant Orders    Vitamin D 25 Hydroxy    Comprehensive Metabolic Panel       Endocrine    Hypothyroidism due to Hashimoto's thyroiditis    Relevant Orders    T4, Free    TSH          Plan  -Synthroid 225. Repeat labs showed normal thyroid function. She receives the med from the [facility].      -Vit D deficiency - cont 800 IU daily.     -repeat labs before the next visit  . Recent labs were reviewed and normal on 7/1/2020.       Follow-up in 6 months.

## 2020-09-03 DIAGNOSIS — E03.8 HYPOTHYROIDISM DUE TO HASHIMOTO'S THYROIDITIS: ICD-10-CM

## 2020-09-03 DIAGNOSIS — E06.3 HYPOTHYROIDISM DUE TO HASHIMOTO'S THYROIDITIS: ICD-10-CM

## 2020-09-03 RX ORDER — LEVOTHYROXINE SODIUM 200 MCG
200 TABLET ORAL DAILY
Qty: 30 TABLET | Refills: 3 | Status: SHIPPED | OUTPATIENT
Start: 2020-09-03 | End: 2020-11-04 | Stop reason: SDUPTHER

## 2020-09-03 RX ORDER — LEVOTHYROXINE SODIUM 25 MCG
25 TABLET ORAL DAILY
Qty: 30 TABLET | Refills: 3 | Status: SHIPPED | OUTPATIENT
Start: 2020-09-03 | End: 2020-10-23 | Stop reason: SDUPTHER

## 2020-10-23 ENCOUNTER — TELEPHONE (OUTPATIENT)
Dept: ENDOCRINOLOGY | Facility: CLINIC | Age: 45
End: 2020-10-23

## 2020-10-23 DIAGNOSIS — E03.8 HYPOTHYROIDISM DUE TO HASHIMOTO'S THYROIDITIS: ICD-10-CM

## 2020-10-23 DIAGNOSIS — E06.3 HYPOTHYROIDISM DUE TO HASHIMOTO'S THYROIDITIS: ICD-10-CM

## 2020-10-23 NOTE — TELEPHONE ENCOUNTER
Uma from [facility] is Requesting Rx refill ofSYNTHROID 25 MCG tablet for PT. Uma confirmed pickup location for the [facility] home  delivery

## 2020-10-25 RX ORDER — LEVOTHYROXINE SODIUM 25 MCG
25 TABLET ORAL DAILY
Qty: 30 TABLET | Refills: 3 | Status: SHIPPED | OUTPATIENT
Start: 2020-10-25 | End: 2020-11-04 | Stop reason: SDUPTHER

## 2020-11-04 RX ORDER — LEVOTHYROXINE SODIUM 25 MCG
25 TABLET ORAL DAILY
Qty: 90 TABLET | Refills: 3 | Status: SHIPPED | OUTPATIENT
Start: 2020-11-04 | End: 2021-03-10 | Stop reason: SDUPTHER

## 2020-11-04 RX ORDER — LEVOTHYROXINE SODIUM 200 MCG
200 TABLET ORAL DAILY
Qty: 90 TABLET | Refills: 3 | Status: SHIPPED | OUTPATIENT
Start: 2020-11-04 | End: 2021-03-10 | Stop reason: SDUPTHER

## 2021-01-07 ENCOUNTER — TELEPHONE (OUTPATIENT)
Dept: ENDOCRINOLOGY | Facility: CLINIC | Age: 46
End: 2021-01-07

## 2021-01-07 ENCOUNTER — LAB (OUTPATIENT)
Dept: LAB | Facility: HOSPITAL | Age: 46
End: 2021-01-07

## 2021-01-07 DIAGNOSIS — E55.9 VITAMIN D DEFICIENCY: ICD-10-CM

## 2021-01-07 DIAGNOSIS — E03.8 HYPOTHYROIDISM DUE TO HASHIMOTO'S THYROIDITIS: ICD-10-CM

## 2021-01-07 DIAGNOSIS — E06.3 HYPOTHYROIDISM DUE TO HASHIMOTO'S THYROIDITIS: ICD-10-CM

## 2021-01-07 PROCEDURE — 80053 COMPREHEN METABOLIC PANEL: CPT

## 2021-01-07 PROCEDURE — 84443 ASSAY THYROID STIM HORMONE: CPT

## 2021-01-07 PROCEDURE — 82306 VITAMIN D 25 HYDROXY: CPT

## 2021-01-07 PROCEDURE — 84439 ASSAY OF FREE THYROXINE: CPT

## 2021-01-07 NOTE — TELEPHONE ENCOUNTER
Returned patient call, she stated that she received a e-mail stating that synthroid direct or the [facility] pharmacy would not longer be handling synthroid.  She stated she would send the information via my chart so it can be viewed and did not know where she should get the synthroid from.  I will wait for the [employer] message with a copy of the e-mail

## 2021-01-07 NOTE — TELEPHONE ENCOUNTER
Patient called, wanted to speak with Paula about the [facility] and a medication there, please call her back at [phone].

## 2021-01-08 LAB
25(OH)D3 SERPL-MCNC: 32.6 NG/ML (ref 30–100)
ALBUMIN SERPL-MCNC: 4.5 G/DL (ref 3.5–5.2)
ALBUMIN/GLOB SERPL: 1.6 G/DL
ALP SERPL-CCNC: 53 U/L (ref 39–117)
ALT SERPL W P-5'-P-CCNC: 21 U/L (ref 1–33)
ANION GAP SERPL CALCULATED.3IONS-SCNC: 7.7 MMOL/L (ref 5–15)
AST SERPL-CCNC: 14 U/L (ref 1–32)
BILIRUB SERPL-MCNC: 0.2 MG/DL (ref 0–1.2)
BUN SERPL-MCNC: 18 MG/DL (ref 6–20)
BUN/CREAT SERPL: 22.8 (ref 7–25)
CALCIUM SPEC-SCNC: 9.3 MG/DL (ref 8.6–10.5)
CHLORIDE SERPL-SCNC: 103 MMOL/L (ref 98–107)
CO2 SERPL-SCNC: 31.3 MMOL/L (ref 22–29)
CREAT SERPL-MCNC: 0.79 MG/DL (ref 0.57–1)
GFR SERPL CREATININE-BSD FRML MDRD: 78 ML/MIN/1.73
GLOBULIN UR ELPH-MCNC: 2.8 GM/DL
GLUCOSE SERPL-MCNC: 107 MG/DL (ref 65–99)
POTASSIUM SERPL-SCNC: 4.1 MMOL/L (ref 3.5–5.2)
PROT SERPL-MCNC: 7.3 G/DL (ref 6–8.5)
SODIUM SERPL-SCNC: 142 MMOL/L (ref 136–145)
T4 FREE SERPL-MCNC: 1.66 NG/DL (ref 0.93–1.7)
TSH SERPL DL<=0.05 MIU/L-ACNC: 0.85 UIU/ML (ref 0.27–4.2)

## 2021-03-10 ENCOUNTER — OFFICE VISIT (OUTPATIENT)
Dept: ENDOCRINOLOGY | Facility: CLINIC | Age: 46
End: 2021-03-10

## 2021-03-10 VITALS
DIASTOLIC BLOOD PRESSURE: 74 MMHG | TEMPERATURE: 97.3 F | HEIGHT: 63 IN | SYSTOLIC BLOOD PRESSURE: 118 MMHG | HEART RATE: 78 BPM | OXYGEN SATURATION: 98 % | WEIGHT: 293 LBS | BODY MASS INDEX: 51.91 KG/M2

## 2021-03-10 DIAGNOSIS — E03.8 HYPOTHYROIDISM DUE TO HASHIMOTO'S THYROIDITIS: Primary | ICD-10-CM

## 2021-03-10 DIAGNOSIS — E06.3 HYPOTHYROIDISM DUE TO HASHIMOTO'S THYROIDITIS: Primary | ICD-10-CM

## 2021-03-10 DIAGNOSIS — E28.2 PCOS (POLYCYSTIC OVARIAN SYNDROME): ICD-10-CM

## 2021-03-10 DIAGNOSIS — E55.9 VITAMIN D DEFICIENCY: ICD-10-CM

## 2021-03-10 PROCEDURE — 99214 OFFICE O/P EST MOD 30 MIN: CPT | Performed by: INTERNAL MEDICINE

## 2021-03-10 RX ORDER — LEVOTHYROXINE SODIUM 200 MCG
200 TABLET ORAL DAILY
Qty: 90 TABLET | Refills: 3 | Status: SHIPPED | OUTPATIENT
Start: 2021-03-10 | End: 2021-11-22

## 2021-03-10 RX ORDER — PYRIDOXINE HCL (VITAMIN B6) 25 MG
LOZENGE ON A HANDLE MUCOUS MEMBRANE
COMMUNITY
End: 2021-03-10 | Stop reason: SDUPTHER

## 2021-03-10 RX ORDER — PYRIDOXINE HCL (VITAMIN B6) 25 MG
1 LOZENGE ON A HANDLE MUCOUS MEMBRANE WEEKLY
Qty: 4 WAFER | Refills: 11
Start: 2021-03-10

## 2021-03-10 RX ORDER — LEVOTHYROXINE SODIUM 25 MCG
25 TABLET ORAL DAILY
Qty: 90 TABLET | Refills: 3 | Status: SHIPPED | OUTPATIENT
Start: 2021-03-10 | End: 2022-03-29

## 2021-03-10 NOTE — PROGRESS NOTES
Chief complaint  Hypothyroidism (Follow UP)    Subjective   Kanwal Bruner is a 45 y.o. female is here today for follow-up.   PCOS - Clinical features include oligomenorrhea and insulin resistance.   Metformin caused diarrhea and heart palpitations. She stopped medication and diarrhea resolved.    Symptoms: acne, skin darkening, weight gain. She is following the diet     Hypothyroidism. The patient has Hashimoto's thyroiditis dx 2004. Current treatment includes levothyroxine (Synthroid)  225 mcg daily.     Vit D deficiency  She stopped Vit D supplements because of diarrhea.  Her last visit Vit D was low. Trial of Replesta didn't cause side effects.     Prediabetes with A1C of 5.9 in 09/2018. She is exercising - water aerobics, follows the diet.     She c/o weight gain. Energy level improved.   She noted less bloating with avoiding lactose.       Medications    Current Outpatient Medications:   •  Ascorbic Acid Buffered (Buffered Vitamin C) 1000 MG capsule, , Disp: , Rfl:   •  Cholecalciferol (Replesta) 1.25 MG (80200 UT) wafer, Take 1 wafer by mouth 1 (One) Time Per Week., Disp: 4 wafer, Rfl: 11  •  EPINEPHrine (EPIPEN) 0.3 MG/0.3ML solution auto-injector injection, Inject 0.3 ML into the shoulder, thigh, or buttocks for 1 dose., Disp: 2 each, Rfl: 11  •  levocetirizine (Xyzal Allergy 24HR) 5 MG tablet, Take 1 tablet by mouth Daily., Disp: , Rfl:   •  Synthroid 200 MCG tablet, Take 1 tablet by mouth Daily. Total dose 225 mcg daily, Disp: 90 tablet, Rfl: 3  •  Synthroid 25 MCG tablet, Take 1 tablet by mouth Daily. Total dose 225 mcg daily, Disp: 90 tablet, Rfl: 3  •  Zinc 50 MG capsule, , Disp: , Rfl:     Review of systems  Review of Systems   Constitutional: Positive for fatigue and unexpected weight change (weight gain). Negative for activity change, appetite change, chills and diaphoresis.   HENT: Negative for congestion, ear pain, facial swelling, hearing loss, postnasal drip, sore throat, trouble swallowing  "and voice change.    Eyes: Negative.  Negative for redness, itching and visual disturbance.   Respiratory: Negative for cough and shortness of breath.    Cardiovascular: Negative for chest pain, palpitations and leg swelling.   Gastrointestinal: Negative for abdominal distention, abdominal pain, constipation, diarrhea, nausea and vomiting.   Endocrine:        As listed in HPI   Genitourinary: Negative.    Musculoskeletal: Negative for arthralgias, back pain, joint swelling, myalgias, neck pain and neck stiffness.   Skin: Negative.         Hair loss   Allergic/Immunologic: Negative.    Neurological: Positive for weakness. Negative for dizziness, tremors, syncope, light-headedness and headaches.   Hematological: Negative.    Psychiatric/Behavioral: Negative.    All other systems reviewed and are negative.      Physical exam  Objective   Blood pressure 118/74, pulse 78, temperature 97.3 °F (36.3 °C), height 160 cm (63\"), weight (!) 156 kg (345 lb), SpO2 98 %, not currently breastfeeding.   Physical Exam   Constitutional: She is oriented to person, place, and time. She appears well-developed.   Morbidly obese   HENT:   Head: Normocephalic and atraumatic.   Eyes: Conjunctivae are normal.   Neck: No thyromegaly present.   The neck is supple and symmetric   Cardiovascular: Normal rate, regular rhythm and normal heart sounds.   Pulmonary/Chest: Effort normal and breath sounds normal.   Lymphadenopathy:     She has no cervical adenopathy.   Neurological: She is alert and oriented to person, place, and time.   Skin: Skin is warm and dry. No rash noted.   Psychiatric: Thought content normal.   Vitals reviewed.        Assessment  Assessment/Plan   Problems Addressed this Visit        Other    Hypothyroidism due to Hashimoto's thyroiditis - Primary    Relevant Medications    Synthroid 200 MCG tablet    Synthroid 25 MCG tablet    Other Relevant Orders    TSH    T4, Free    PCOS (polycystic ovarian syndrome)    Relevant Orders    " Lipid Panel    Comprehensive Metabolic Panel    Vitamin D deficiency    Relevant Orders    Vitamin D 25 Hydroxy      Diagnoses       Codes Comments    Hypothyroidism due to Hashimoto's thyroiditis    -  Primary ICD-10-CM: E03.8, E06.3  ICD-9-CM: 244.8, 245.2     PCOS (polycystic ovarian syndrome)     ICD-10-CM: E28.2  ICD-9-CM: 256.4     Vitamin D deficiency     ICD-10-CM: E55.9  ICD-9-CM: 268.9           Plan  -Synthroid 225 mcg. Repeat labs in Jan 2021 showed normal TFT.    -Vit D deficiency - cont Replesta - 14 000 IU once weekly.  She added additional 62200 IU     -repeat labs before the next visit      Follow-up in 4-6 months.

## 2021-08-04 ENCOUNTER — TRANSCRIBE ORDERS (OUTPATIENT)
Dept: ADMINISTRATIVE | Facility: HOSPITAL | Age: 46
End: 2021-08-04

## 2021-08-04 DIAGNOSIS — Z12.31 VISIT FOR SCREENING MAMMOGRAM: Primary | ICD-10-CM

## 2021-09-09 ENCOUNTER — OFFICE VISIT (OUTPATIENT)
Dept: ENDOCRINOLOGY | Facility: CLINIC | Age: 46
End: 2021-09-09

## 2021-09-09 ENCOUNTER — LAB (OUTPATIENT)
Dept: LAB | Facility: HOSPITAL | Age: 46
End: 2021-09-09

## 2021-09-09 VITALS
WEIGHT: 293 LBS | HEIGHT: 63 IN | DIASTOLIC BLOOD PRESSURE: 74 MMHG | HEART RATE: 64 BPM | BODY MASS INDEX: 51.91 KG/M2 | SYSTOLIC BLOOD PRESSURE: 122 MMHG | OXYGEN SATURATION: 100 %

## 2021-09-09 DIAGNOSIS — E28.2 PCOS (POLYCYSTIC OVARIAN SYNDROME): ICD-10-CM

## 2021-09-09 DIAGNOSIS — E06.3 HYPOTHYROIDISM DUE TO HASHIMOTO'S THYROIDITIS: ICD-10-CM

## 2021-09-09 DIAGNOSIS — E55.9 VITAMIN D DEFICIENCY: ICD-10-CM

## 2021-09-09 DIAGNOSIS — E03.8 HYPOTHYROIDISM DUE TO HASHIMOTO'S THYROIDITIS: ICD-10-CM

## 2021-09-09 LAB
ALBUMIN SERPL-MCNC: 4.2 G/DL (ref 3.5–5.2)
ALBUMIN/GLOB SERPL: 1.6 G/DL
ALP SERPL-CCNC: 47 U/L (ref 39–117)
ALT SERPL W P-5'-P-CCNC: 20 U/L (ref 1–33)
ANION GAP SERPL CALCULATED.3IONS-SCNC: 7.5 MMOL/L (ref 5–15)
AST SERPL-CCNC: 17 U/L (ref 1–32)
BILIRUB SERPL-MCNC: 0.2 MG/DL (ref 0–1.2)
BUN SERPL-MCNC: 13 MG/DL (ref 6–20)
BUN/CREAT SERPL: 20 (ref 7–25)
CALCIUM SPEC-SCNC: 9.4 MG/DL (ref 8.6–10.5)
CHLORIDE SERPL-SCNC: 101 MMOL/L (ref 98–107)
CHOLEST SERPL-MCNC: 169 MG/DL (ref 0–200)
CO2 SERPL-SCNC: 27.5 MMOL/L (ref 22–29)
CREAT SERPL-MCNC: 0.65 MG/DL (ref 0.57–1)
GFR SERPL CREATININE-BSD FRML MDRD: 98 ML/MIN/1.73
GLOBULIN UR ELPH-MCNC: 2.6 GM/DL
GLUCOSE SERPL-MCNC: 109 MG/DL (ref 65–99)
HDLC SERPL-MCNC: 34 MG/DL (ref 40–60)
LDLC SERPL CALC-MCNC: 106 MG/DL (ref 0–100)
LDLC/HDLC SERPL: 3.02 {RATIO}
POTASSIUM SERPL-SCNC: 4 MMOL/L (ref 3.5–5.2)
PROT SERPL-MCNC: 6.8 G/DL (ref 6–8.5)
SODIUM SERPL-SCNC: 136 MMOL/L (ref 136–145)
T4 FREE SERPL-MCNC: 1.86 NG/DL (ref 0.93–1.7)
TRIGL SERPL-MCNC: 162 MG/DL (ref 0–150)
TSH SERPL DL<=0.05 MIU/L-ACNC: 0.88 UIU/ML (ref 0.27–4.2)
VLDLC SERPL-MCNC: 29 MG/DL (ref 5–40)

## 2021-09-09 PROCEDURE — 80061 LIPID PANEL: CPT | Performed by: INTERNAL MEDICINE

## 2021-09-09 PROCEDURE — 84443 ASSAY THYROID STIM HORMONE: CPT | Performed by: INTERNAL MEDICINE

## 2021-09-09 PROCEDURE — 99214 OFFICE O/P EST MOD 30 MIN: CPT | Performed by: INTERNAL MEDICINE

## 2021-09-09 PROCEDURE — 80053 COMPREHEN METABOLIC PANEL: CPT | Performed by: INTERNAL MEDICINE

## 2021-09-09 PROCEDURE — 84439 ASSAY OF FREE THYROXINE: CPT | Performed by: INTERNAL MEDICINE

## 2021-09-09 PROCEDURE — 82306 VITAMIN D 25 HYDROXY: CPT | Performed by: INTERNAL MEDICINE

## 2021-09-09 NOTE — PROGRESS NOTES
Chief complaint  Hypothyroidism (Follow Up)    Subjective   Kanwal Bruner is a 45 y.o. female is here today for follow-up.   PCOS - Clinical features include oligomenorrhea and insulin resistance.   Metformin caused diarrhea and heart palpitations.   Symptoms: acne, skin darkening, weight gain. She is following the diet     Hypothyroidism. The patient has Hashimoto's thyroiditis dx 2004. Current treatment includes levothyroxine (Synthroid)  225 mcg daily.     Vit D deficiency  She experienced side effects of diarrhea with over-the-counter vitamin D supplements.  Trial of replastaresulted in good tolerance and she is taking it currently    Prediabetes with A1C of 5.9 in 09/2018. She is exercising - water aerobics, follows the diet.     She is doing better - lost weight and energy level is good.        Medications    Current Outpatient Medications:   •  Ascorbic Acid Buffered (Buffered Vitamin C) 1000 MG capsule, , Disp: , Rfl:   •  Cholecalciferol (Replesta) 1.25 MG (13721 UT) wafer, Take 1 wafer by mouth 1 (One) Time Per Week., Disp: 4 wafer, Rfl: 11  •  EPINEPHrine (EPIPEN) 0.3 MG/0.3ML solution auto-injector injection, Inject 0.3 ML into the shoulder, thigh, or buttocks for 1 dose., Disp: 2 each, Rfl: 11  •  levocetirizine (Xyzal Allergy 24HR) 5 MG tablet, Take 1 tablet by mouth Daily., Disp: , Rfl:   •  Synthroid 200 MCG tablet, Take 1 tablet by mouth Daily. Total dose 225 mcg daily, Disp: 90 tablet, Rfl: 3  •  Synthroid 25 MCG tablet, Take 1 tablet by mouth Daily. Total dose 225 mcg daily, Disp: 90 tablet, Rfl: 3  •  Zinc 50 MG capsule, , Disp: , Rfl:     Review of systems  Review of Systems   Constitutional: Positive for fatigue and unexpected weight change (weight gain). Negative for activity change, appetite change, chills and diaphoresis.   HENT: Negative for congestion, ear pain, facial swelling, hearing loss, postnasal drip, sore throat, trouble swallowing and voice change.    Eyes: Negative.   "Negative for redness, itching and visual disturbance.   Respiratory: Negative for cough and shortness of breath.    Cardiovascular: Negative for chest pain, palpitations and leg swelling.   Gastrointestinal: Negative for abdominal distention, abdominal pain, constipation, diarrhea, nausea and vomiting.   Endocrine:        As listed in HPI   Genitourinary: Negative.    Musculoskeletal: Negative for arthralgias, back pain, joint swelling, myalgias, neck pain and neck stiffness.   Skin: Negative.         Hair loss   Allergic/Immunologic: Negative.    Neurological: Positive for weakness. Negative for dizziness, tremors, syncope, light-headedness and headaches.   Hematological: Negative.    Psychiatric/Behavioral: Negative.    All other systems reviewed and are negative.      Physical exam  Objective   Blood pressure 122/74, pulse 64, height 160 cm (63\"), weight (!) 153 kg (336 lb 12.8 oz), SpO2 100 %, not currently breastfeeding.   Physical Exam   Constitutional: She is oriented to person, place, and time. She appears well-developed.   Morbidly obese   HENT:   Head: Normocephalic and atraumatic.   Eyes: Conjunctivae are normal.   Neck: No thyromegaly present.   The neck is supple and symmetric   Cardiovascular: Normal rate, regular rhythm and normal heart sounds.   Pulmonary/Chest: Effort normal and breath sounds normal.   Lymphadenopathy:     She has no cervical adenopathy.   Neurological: She is alert and oriented to person, place, and time.   Skin: Skin is warm and dry. No rash noted.   Psychiatric: Thought content normal.   Vitals reviewed.        Assessment  Assessment/Plan   Problems Addressed this Visit        Other    Hypothyroidism due to Hashimoto's thyroiditis    PCOS (polycystic ovarian syndrome)    Vitamin D deficiency      Diagnoses       Codes Comments    Vitamin D deficiency     ICD-10-CM: E55.9  ICD-9-CM: 268.9     PCOS (polycystic ovarian syndrome)     ICD-10-CM: E28.2  ICD-9-CM: 256.4     " Hypothyroidism due to Hashimoto's thyroiditis     ICD-10-CM: E03.8, E06.3  ICD-9-CM: 244.8, 245.2           Plan  -Synthroid 225 mcg. TFT are slightly on the higher side and she may simplify the regimen by reducing to 200 mcg daily   -Vit D deficiency - cont Replesta - 14 000 IU twice weekly.      Vit D is normal. Fasting labs completed today.     Follow-up in 4-6 months.

## 2021-09-10 LAB — 25(OH)D3 SERPL-MCNC: 36.2 NG/ML

## 2021-09-13 ENCOUNTER — HOSPITAL ENCOUNTER (OUTPATIENT)
Dept: MAMMOGRAPHY | Facility: HOSPITAL | Age: 46
Discharge: HOME | End: 2021-09-13
Admitting: OBSTETRICS & GYNECOLOGY

## 2021-09-13 DIAGNOSIS — Z12.31 VISIT FOR SCREENING MAMMOGRAM: ICD-10-CM

## 2021-09-13 PROCEDURE — 77067 SCR MAMMO BI INCL CAD: CPT

## 2021-09-13 PROCEDURE — 77063 BREAST TOMOSYNTHESIS BI: CPT | Performed by: RADIOLOGY

## 2021-09-13 PROCEDURE — 77067 SCR MAMMO BI INCL CAD: CPT | Performed by: RADIOLOGY

## 2021-09-13 PROCEDURE — 77063 BREAST TOMOSYNTHESIS BI: CPT

## 2021-11-20 DIAGNOSIS — E06.3 HYPOTHYROIDISM DUE TO HASHIMOTO'S THYROIDITIS: ICD-10-CM

## 2021-11-20 DIAGNOSIS — E03.8 HYPOTHYROIDISM DUE TO HASHIMOTO'S THYROIDITIS: ICD-10-CM

## 2021-11-22 RX ORDER — LEVOTHYROXINE SODIUM 200 MCG
200 TABLET ORAL DAILY
Qty: 90 TABLET | Refills: 0 | Status: SHIPPED | OUTPATIENT
Start: 2021-11-22 | End: 2022-04-13 | Stop reason: SDUPTHER

## 2022-02-10 ENCOUNTER — TELEPHONE (OUTPATIENT)
Dept: ENDOCRINOLOGY | Facility: CLINIC | Age: 47
End: 2022-02-10

## 2022-02-10 ENCOUNTER — LAB (OUTPATIENT)
Dept: LAB | Facility: HOSPITAL | Age: 47
End: 2022-02-10

## 2022-02-10 DIAGNOSIS — R73.03 PREDIABETES: ICD-10-CM

## 2022-02-10 DIAGNOSIS — E06.3 HYPOTHYROIDISM DUE TO HASHIMOTO'S THYROIDITIS: Primary | ICD-10-CM

## 2022-02-10 DIAGNOSIS — E06.3 HYPOTHYROIDISM DUE TO HASHIMOTO'S THYROIDITIS: ICD-10-CM

## 2022-02-10 DIAGNOSIS — E03.8 HYPOTHYROIDISM DUE TO HASHIMOTO'S THYROIDITIS: ICD-10-CM

## 2022-02-10 DIAGNOSIS — E03.8 HYPOTHYROIDISM DUE TO HASHIMOTO'S THYROIDITIS: Primary | ICD-10-CM

## 2022-02-10 DIAGNOSIS — E55.9 VITAMIN D DEFICIENCY: ICD-10-CM

## 2022-02-10 LAB
ALBUMIN SERPL-MCNC: 4.4 G/DL (ref 3.5–5.2)
ALBUMIN/GLOB SERPL: 1.5 G/DL
ALP SERPL-CCNC: 54 U/L (ref 39–117)
ALT SERPL W P-5'-P-CCNC: 20 U/L (ref 1–33)
ANION GAP SERPL CALCULATED.3IONS-SCNC: 8.3 MMOL/L (ref 5–15)
AST SERPL-CCNC: 13 U/L (ref 1–32)
BILIRUB SERPL-MCNC: 0.2 MG/DL (ref 0–1.2)
BUN SERPL-MCNC: 17 MG/DL (ref 6–20)
BUN/CREAT SERPL: 20.5 (ref 7–25)
CALCIUM SPEC-SCNC: 9.1 MG/DL (ref 8.6–10.5)
CHLORIDE SERPL-SCNC: 102 MMOL/L (ref 98–107)
CO2 SERPL-SCNC: 28.7 MMOL/L (ref 22–29)
CREAT SERPL-MCNC: 0.83 MG/DL (ref 0.57–1)
GFR SERPL CREATININE-BSD FRML MDRD: 73 ML/MIN/1.73
GLOBULIN UR ELPH-MCNC: 3 GM/DL
GLUCOSE SERPL-MCNC: 95 MG/DL (ref 65–99)
HBA1C MFR BLD: 5.9 % (ref 4.8–5.6)
POTASSIUM SERPL-SCNC: 3.7 MMOL/L (ref 3.5–5.2)
PROT SERPL-MCNC: 7.4 G/DL (ref 6–8.5)
SODIUM SERPL-SCNC: 139 MMOL/L (ref 136–145)
T4 FREE SERPL-MCNC: 1.49 NG/DL (ref 0.93–1.7)
TSH SERPL DL<=0.05 MIU/L-ACNC: 3.62 UIU/ML (ref 0.27–4.2)

## 2022-02-10 PROCEDURE — 83036 HEMOGLOBIN GLYCOSYLATED A1C: CPT

## 2022-02-10 PROCEDURE — 36415 COLL VENOUS BLD VENIPUNCTURE: CPT

## 2022-02-10 PROCEDURE — 82306 VITAMIN D 25 HYDROXY: CPT

## 2022-02-10 PROCEDURE — 80053 COMPREHEN METABOLIC PANEL: CPT

## 2022-02-10 PROCEDURE — 84443 ASSAY THYROID STIM HORMONE: CPT

## 2022-02-10 PROCEDURE — 84439 ASSAY OF FREE THYROXINE: CPT

## 2022-02-10 NOTE — TELEPHONE ENCOUNTER
Spoke with pt, she stated she did not think she would need to be seen earlier swelling on Lymph nodes could be left over from Covid.  She will just have labs drawn this time.

## 2022-02-10 NOTE — TELEPHONE ENCOUNTER
Patient called and is requesting a appt to have her thyroids tested. She said she has not been feeling good/fatigue/not sleeping/lymph nodes swollen/hard to swallow. Her number [phone]

## 2022-02-10 NOTE — TELEPHONE ENCOUNTER
Returned pt call, confirmed SX.  Will place orders for thyroid and vit and ask Dr Gonzalez if there are any others she would like to add.

## 2022-02-10 NOTE — TELEPHONE ENCOUNTER
Lets also add A1C. Does she want to be seen earlier than her scheduled appointment? I have several openings in the next 2 weeks. If she gives a few days that work for her, I will find something.

## 2022-02-11 LAB — 25(OH)D3 SERPL-MCNC: 42.8 NG/ML (ref 30–100)

## 2022-03-29 RX ORDER — LEVOTHYROXINE SODIUM 25 MCG
TABLET ORAL
Qty: 90 TABLET | Refills: 0 | Status: SHIPPED | OUTPATIENT
Start: 2022-03-29 | End: 2022-04-13 | Stop reason: SDUPTHER

## 2022-04-13 ENCOUNTER — OFFICE VISIT (OUTPATIENT)
Dept: ENDOCRINOLOGY | Facility: CLINIC | Age: 47
End: 2022-04-13

## 2022-04-13 ENCOUNTER — TELEPHONE (OUTPATIENT)
Dept: ENDOCRINOLOGY | Facility: CLINIC | Age: 47
End: 2022-04-13

## 2022-04-13 VITALS
SYSTOLIC BLOOD PRESSURE: 126 MMHG | WEIGHT: 293 LBS | HEIGHT: 63 IN | OXYGEN SATURATION: 98 % | HEART RATE: 84 BPM | DIASTOLIC BLOOD PRESSURE: 74 MMHG | BODY MASS INDEX: 51.91 KG/M2

## 2022-04-13 DIAGNOSIS — E03.8 HYPOTHYROIDISM DUE TO HASHIMOTO'S THYROIDITIS: Primary | ICD-10-CM

## 2022-04-13 DIAGNOSIS — E06.3 HYPOTHYROIDISM DUE TO HASHIMOTO'S THYROIDITIS: ICD-10-CM

## 2022-04-13 DIAGNOSIS — E28.2 PCOS (POLYCYSTIC OVARIAN SYNDROME): ICD-10-CM

## 2022-04-13 DIAGNOSIS — E06.3 HYPOTHYROIDISM DUE TO HASHIMOTO'S THYROIDITIS: Primary | ICD-10-CM

## 2022-04-13 DIAGNOSIS — E03.8 HYPOTHYROIDISM DUE TO HASHIMOTO'S THYROIDITIS: ICD-10-CM

## 2022-04-13 PROCEDURE — 99214 OFFICE O/P EST MOD 30 MIN: CPT | Performed by: INTERNAL MEDICINE

## 2022-04-13 RX ORDER — LEVOTHYROXINE SODIUM 25 MCG
12.5 TABLET ORAL DAILY
Qty: 90 TABLET | Refills: 3 | Status: SHIPPED | OUTPATIENT
Start: 2022-04-13 | End: 2022-04-14 | Stop reason: SDUPTHER

## 2022-04-13 RX ORDER — LEVOTHYROXINE SODIUM 200 MCG
200 TABLET ORAL DAILY
Qty: 90 TABLET | Refills: 3 | Status: SHIPPED | OUTPATIENT
Start: 2022-04-13 | End: 2022-04-14 | Stop reason: SDUPTHER

## 2022-04-13 NOTE — PROGRESS NOTES
Chief complaint  Hypothyroidism (Follow Up)    Subjective   Kanwal Bruner is a 46 y.o. female is here today for follow-up.   PCOS - Clinical features include oligomenorrhea and insulin resistance.   Metformin caused diarrhea and heart palpitations.   Symptoms: acne, skin darkening, weight gain. She is following the diet     Hypothyroidism. The patient has Hashimoto's thyroiditis dx 2004. Current treatment includes levothyroxine (Synthroid)  225 mcg daily.     Vit D deficiency  She experienced side effects of diarrhea with over-the-counter vitamin D supplements.  Trial of replastaresulted in good tolerance and she is taking it currently    Prediabetes with A1C of 5.9 in 09/2018. She is exercising - water aerobics, follows the diet.     She is doing better - lost weight and energy level is good.        Medications    Current Outpatient Medications:   •  Apple Cider Vinegar 300 MG tablet, Take  by mouth., Disp: , Rfl:   •  Ascorbic Acid Buffered (Buffered Vitamin C) 1000 MG capsule, , Disp: , Rfl:   •  ASHWAGANDHA PO, Take  by mouth., Disp: , Rfl:   •  Cholecalciferol (Replesta) 1.25 MG (42074 UT) wafer, Take 1 wafer by mouth 1 (One) Time Per Week., Disp: 4 wafer, Rfl: 11  •  EPINEPHrine (EPIPEN) 0.3 MG/0.3ML solution auto-injector injection, Inject 0.3 ML into the shoulder, thigh, or buttocks for 1 dose., Disp: 2 each, Rfl: 11  •  levocetirizine (XYZAL) 5 MG tablet, Take 1 tablet by mouth Daily., Disp: , Rfl:   •  Multiple Vitamins-Minerals (EMERGEN-C SUPER FRUIT PO), Take  by mouth., Disp: , Rfl:   •  Synthroid 200 MCG tablet, Take 1 tablet by mouth Daily., Disp: 90 tablet, Rfl: 3  •  Synthroid 25 MCG tablet, Take 0.5 tablets by mouth Daily., Disp: 90 tablet, Rfl: 3  •  Zinc 50 MG capsule, , Disp: , Rfl:     Review of systems  Review of Systems   Constitutional: Positive for fatigue. Negative for activity change, appetite change, chills and diaphoresis.   HENT: Negative for congestion, ear pain, facial  "swelling, hearing loss, postnasal drip, sore throat, trouble swallowing and voice change.    Eyes: Negative.  Negative for redness, itching and visual disturbance.   Respiratory: Negative for cough and shortness of breath.    Cardiovascular: Negative for chest pain, palpitations and leg swelling.   Gastrointestinal: Negative for abdominal distention, abdominal pain, constipation, diarrhea, nausea and vomiting.   Endocrine:        As listed in HPI   Genitourinary: Negative.    Musculoskeletal: Negative for arthralgias, back pain, joint swelling, myalgias, neck pain and neck stiffness.   Skin: Negative.         Hair loss   Allergic/Immunologic: Negative.    Neurological: Positive for weakness. Negative for dizziness, tremors, syncope, light-headedness and headaches.   Hematological: Negative.    Psychiatric/Behavioral: Negative.    All other systems reviewed and are negative.      Physical exam  Objective   Blood pressure 126/74, pulse 84, height 160 cm (63\"), weight (!) 152 kg (334 lb 6.4 oz), SpO2 98 %, not currently breastfeeding.   Physical Exam   Constitutional: She is oriented to person, place, and time. She appears well-developed.   Morbidly obese   HENT:   Head: Normocephalic and atraumatic.   Eyes: Conjunctivae are normal.   Neck: No thyromegaly present.   The neck is supple and symmetric   Cardiovascular: Normal rate, regular rhythm and normal heart sounds.   Pulmonary/Chest: Effort normal and breath sounds normal.   Lymphadenopathy:     She has no cervical adenopathy.   Neurological: She is alert and oriented to person, place, and time.   Skin: Skin is warm and dry. No rash noted.   Psychiatric: Thought content normal.   Vitals reviewed.        Assessment  Assessment/Plan   Problems Addressed this Visit        Other    Hypothyroidism due to Hashimoto's thyroiditis - Primary    Relevant Medications    Synthroid 200 MCG tablet    Synthroid 25 MCG tablet    PCOS (polycystic ovarian syndrome)      Diagnoses     "   Codes Comments    Hypothyroidism due to Hashimoto's thyroiditis    -  Primary ICD-10-CM: E03.8, E06.3  ICD-9-CM: 244.8, 245.2     PCOS (polycystic ovarian syndrome)     ICD-10-CM: E28.2  ICD-9-CM: 256.4           Plan    -Synthroid 212 mcg. TFT are normal on current dose and she is feeling her best    -Vit D deficiency - cont Replesta - 14 000 IU twice weekly.      -A1C is 5.9    Labs form 2/10/22 reviewed.     Follow-up in 4-6 months.

## 2022-04-14 RX ORDER — LEVOTHYROXINE SODIUM 200 MCG
200 TABLET ORAL DAILY
Qty: 90 TABLET | Refills: 3 | Status: SHIPPED | OUTPATIENT
Start: 2022-04-14 | End: 2023-03-16

## 2022-04-14 RX ORDER — LEVOTHYROXINE SODIUM 25 MCG
12.5 TABLET ORAL DAILY
Qty: 90 TABLET | Refills: 3 | Status: SHIPPED | OUTPATIENT
Start: 2022-04-14

## 2022-10-26 ENCOUNTER — OFFICE VISIT (OUTPATIENT)
Dept: ENDOCRINOLOGY | Facility: CLINIC | Age: 47
End: 2022-10-26

## 2022-10-26 VITALS
HEART RATE: 72 BPM | BODY MASS INDEX: 51.91 KG/M2 | WEIGHT: 293 LBS | DIASTOLIC BLOOD PRESSURE: 72 MMHG | OXYGEN SATURATION: 98 % | HEIGHT: 63 IN | SYSTOLIC BLOOD PRESSURE: 124 MMHG

## 2022-10-26 DIAGNOSIS — R73.03 PREDIABETES: Primary | ICD-10-CM

## 2022-10-26 DIAGNOSIS — E28.2 PCOS (POLYCYSTIC OVARIAN SYNDROME): ICD-10-CM

## 2022-10-26 DIAGNOSIS — E55.9 VITAMIN D DEFICIENCY: ICD-10-CM

## 2022-10-26 DIAGNOSIS — E06.3 HYPOTHYROIDISM DUE TO HASHIMOTO'S THYROIDITIS: ICD-10-CM

## 2022-10-26 DIAGNOSIS — E03.8 HYPOTHYROIDISM DUE TO HASHIMOTO'S THYROIDITIS: ICD-10-CM

## 2022-10-26 LAB
EXPIRATION DATE: NORMAL
EXPIRATION DATE: NORMAL
GLUCOSE BLDC GLUCOMTR-MCNC: 110 MG/DL (ref 70–130)
HBA1C MFR BLD: 5.9 %
Lab: NORMAL
Lab: NORMAL

## 2022-10-26 PROCEDURE — 99214 OFFICE O/P EST MOD 30 MIN: CPT | Performed by: INTERNAL MEDICINE

## 2022-10-26 PROCEDURE — 82947 ASSAY GLUCOSE BLOOD QUANT: CPT | Performed by: INTERNAL MEDICINE

## 2022-10-26 PROCEDURE — 83036 HEMOGLOBIN GLYCOSYLATED A1C: CPT | Performed by: INTERNAL MEDICINE

## 2022-10-26 RX ORDER — SEMAGLUTIDE 1.34 MG/ML
0.5 INJECTION, SOLUTION SUBCUTANEOUS WEEKLY
Qty: 1.5 ML | Refills: 10 | Status: SHIPPED | OUTPATIENT
Start: 2022-10-26

## 2022-10-26 NOTE — PROGRESS NOTES
Chief complaint  Hypothyroidism and Prediabetes (Follow UP)    Subjective   Kanwal Bruner is a 47 y.o. female is here today for follow-up.   PCOS - Clinical features include oligomenorrhea and insulin resistance.   Metformin caused diarrhea and heart palpitations.     Symptoms: acne, skin darkening, weight gain.          Prediabetes with A1C of 5.9 in 09/2018. She follows the diet, no exercising.   Her weight is not decreasing despite the diet.     Hypothyroidism. The patient has Hashimoto's thyroiditis dx 2004. Current treatment includes levothyroxine (Synthroid)  212 mcg daily.     Vit D deficiency  She experienced side effects of diarrhea with over-the-counter vitamin D supplements.  Trial of replasta resulted in good tolerance and she is taking it currently      Medications    Current Outpatient Medications:   •  Apple Cider Vinegar 300 MG tablet, Take  by mouth., Disp: , Rfl:   •  Ascorbic Acid Buffered (Buffered Vitamin C) 1000 MG capsule, , Disp: , Rfl:   •  ASHWAGANDHA PO, Take  by mouth., Disp: , Rfl:   •  Cholecalciferol (Replesta) 1.25 MG (37589 UT) wafer, Take 1 wafer by mouth 1 (One) Time Per Week., Disp: 4 wafer, Rfl: 11  •  EPINEPHrine (EPIPEN) 0.3 MG/0.3ML solution auto-injector injection, Inject 0.3 ML into the shoulder, thigh, or buttocks for 1 dose., Disp: 2 each, Rfl: 11  •  levocetirizine (XYZAL) 5 MG tablet, Take 1 tablet by mouth Daily., Disp: , Rfl:   •  Multiple Vitamins-Minerals (EMERGEN-C SUPER FRUIT PO), Take  by mouth., Disp: , Rfl:   •  Synthroid 200 MCG tablet, Take 1 tablet by mouth Daily., Disp: 90 tablet, Rfl: 3  •  Synthroid 25 MCG tablet, Take 0.5 tablets by mouth Daily., Disp: 90 tablet, Rfl: 3  •  Zinc 50 MG capsule, , Disp: , Rfl:   •  Semaglutide,0.25 or 0.5MG/DOS, (Ozempic, 0.25 or 0.5 MG/DOSE,) 2 MG/1.5ML solution pen-injector, Inject 0.5 mg under the skin into the appropriate area as directed 1 (One) Time Per Week., Disp: 1.5 mL, Rfl: 10    Review of systems  Review  "of Systems   Constitutional: Positive for fatigue. Negative for activity change, appetite change, chills and diaphoresis.   HENT: Negative for congestion, ear pain, facial swelling, hearing loss, postnasal drip, sore throat, trouble swallowing and voice change.    Eyes: Negative.  Negative for redness, itching and visual disturbance.   Respiratory: Negative for cough and shortness of breath.    Cardiovascular: Positive for leg swelling. Negative for chest pain and palpitations.   Gastrointestinal: Negative for abdominal distention, abdominal pain, constipation, diarrhea, nausea and vomiting.   Endocrine:        As listed in HPI   Genitourinary: Negative.    Musculoskeletal: Negative for arthralgias, back pain, joint swelling, myalgias, neck pain and neck stiffness.   Skin: Negative.         Hair loss   Allergic/Immunologic: Negative.    Neurological: Positive for weakness. Negative for dizziness, tremors, syncope, light-headedness and headaches.   Hematological: Negative.    Psychiatric/Behavioral: Negative.    All other systems reviewed and are negative.      Physical exam  Objective   Blood pressure 124/72, pulse 72, height 160 cm (63\"), weight (!) 152 kg (335 lb 6.4 oz), SpO2 98 %, not currently breastfeeding. Body mass index is 59.41 kg/m².  Physical Exam   Constitutional: She is oriented to person, place, and time. She appears well-developed.   Morbidly obese   HENT:   Head: Normocephalic and atraumatic.   Eyes: Conjunctivae are normal.   Neck: No thyromegaly present.   Cardiovascular: Normal rate, regular rhythm, normal heart sounds and normal pulses.   Pulmonary/Chest: Effort normal and breath sounds normal.   Neurological: She is alert and oriented to person, place, and time.   Psychiatric: Thought content normal.   Vitals reviewed.        Assessment  Assessment & Plan   Problems Addressed this Visit        Other    Hypothyroidism due to Hashimoto's thyroiditis    Relevant Orders    TSH    T4, Free    PCOS " (polycystic ovarian syndrome)    Vitamin D deficiency    Relevant Orders    Vitamin D,25-Hydroxy    Prediabetes - Primary    Relevant Medications    Semaglutide,0.25 or 0.5MG/DOS, (Ozempic, 0.25 or 0.5 MG/DOSE,) 2 MG/1.5ML solution pen-injector    Other Relevant Orders    POC Glycosylated Hemoglobin (Hb A1C) (Completed)    POC Glucose, Blood (Completed)    Lipid Panel    Comprehensive Metabolic Panel   Diagnoses       Codes Comments    Prediabetes    -  Primary ICD-10-CM: R73.03  ICD-9-CM: 790.29     Hypothyroidism due to Hashimoto's thyroiditis     ICD-10-CM: E03.8, E06.3  ICD-9-CM: 244.8, 245.2     PCOS (polycystic ovarian syndrome)     ICD-10-CM: E28.2  ICD-9-CM: 256.4     Vitamin D deficiency     ICD-10-CM: E55.9  ICD-9-CM: 268.9           Plan    -Synthroid 212 mcg. TFT are normal on current dose and she is feeling her best.  Repeat labs next week.       -Vit D deficiency - cont Replesta - 14 000 IU twice weekly.      -A1C is 5.9. Patient has a significant insulin resistance, elevated glucose. She would benefit from the GLP-1 agonist.   She has been on metformin in the past and didn't tolerate it - severe allergic reaction.   Start Ozempic at 0.25 mg weekly, increase to 0.5 mg in 1 month. Education on administration given and sample provided.     Follow-up in 4-6 months.

## 2022-10-27 ENCOUNTER — TELEPHONE (OUTPATIENT)
Dept: ENDOCRINOLOGY | Facility: CLINIC | Age: 47
End: 2022-10-27

## 2022-10-27 NOTE — TELEPHONE ENCOUNTER
PLEASE CALL PT BACK ITS REGARDING HER OZEMPIC   THEY WANT HER TO DO A STEP THERAPY  THEY WANT HER TO TAKE METFORMIN-SHE SAYS SHE IS NOT ABLE TO TAKE THIS MEDICATION    PLEASE CALL HER BACK  [phone]

## 2022-11-01 ENCOUNTER — LAB (OUTPATIENT)
Dept: LAB | Facility: HOSPITAL | Age: 47
End: 2022-11-01

## 2022-11-01 DIAGNOSIS — E55.9 VITAMIN D DEFICIENCY: ICD-10-CM

## 2022-11-01 DIAGNOSIS — E06.3 HYPOTHYROIDISM DUE TO HASHIMOTO'S THYROIDITIS: ICD-10-CM

## 2022-11-01 DIAGNOSIS — E03.8 HYPOTHYROIDISM DUE TO HASHIMOTO'S THYROIDITIS: ICD-10-CM

## 2022-11-01 DIAGNOSIS — R73.03 PREDIABETES: ICD-10-CM

## 2022-11-01 PROCEDURE — 80053 COMPREHEN METABOLIC PANEL: CPT

## 2022-11-01 PROCEDURE — 80061 LIPID PANEL: CPT

## 2022-11-01 PROCEDURE — 82306 VITAMIN D 25 HYDROXY: CPT

## 2022-11-01 PROCEDURE — 84439 ASSAY OF FREE THYROXINE: CPT

## 2022-11-01 PROCEDURE — 84443 ASSAY THYROID STIM HORMONE: CPT

## 2022-11-02 LAB
25(OH)D3 SERPL-MCNC: 40.5 NG/ML (ref 30–100)
ALBUMIN SERPL-MCNC: 4.4 G/DL (ref 3.5–5.2)
ALBUMIN/GLOB SERPL: 1.5 G/DL
ALP SERPL-CCNC: 52 U/L (ref 39–117)
ALT SERPL W P-5'-P-CCNC: 17 U/L (ref 1–33)
ANION GAP SERPL CALCULATED.3IONS-SCNC: 11.4 MMOL/L (ref 5–15)
AST SERPL-CCNC: 16 U/L (ref 1–32)
BILIRUB SERPL-MCNC: 0.3 MG/DL (ref 0–1.2)
BUN SERPL-MCNC: 17 MG/DL (ref 6–20)
BUN/CREAT SERPL: 25 (ref 7–25)
CALCIUM SPEC-SCNC: 10.1 MG/DL (ref 8.6–10.5)
CHLORIDE SERPL-SCNC: 102 MMOL/L (ref 98–107)
CHOLEST SERPL-MCNC: 175 MG/DL (ref 0–200)
CO2 SERPL-SCNC: 26.6 MMOL/L (ref 22–29)
CREAT SERPL-MCNC: 0.68 MG/DL (ref 0.57–1)
EGFRCR SERPLBLD CKD-EPI 2021: 106.9 ML/MIN/1.73
GLOBULIN UR ELPH-MCNC: 2.9 GM/DL
GLUCOSE SERPL-MCNC: 84 MG/DL (ref 65–99)
HDLC SERPL-MCNC: 38 MG/DL (ref 40–60)
LDLC SERPL CALC-MCNC: 113 MG/DL (ref 0–100)
LDLC/HDLC SERPL: 2.91 {RATIO}
POTASSIUM SERPL-SCNC: 4.2 MMOL/L (ref 3.5–5.2)
PROT SERPL-MCNC: 7.3 G/DL (ref 6–8.5)
SODIUM SERPL-SCNC: 140 MMOL/L (ref 136–145)
T4 FREE SERPL-MCNC: 1.88 NG/DL (ref 0.93–1.7)
TRIGL SERPL-MCNC: 133 MG/DL (ref 0–150)
TSH SERPL DL<=0.05 MIU/L-ACNC: 3.7 UIU/ML (ref 0.27–4.2)
VLDLC SERPL-MCNC: 24 MG/DL (ref 5–40)

## 2022-11-07 RX ORDER — BLOOD-GLUCOSE METER
1 EACH MISCELLANEOUS DAILY
Qty: 1 KIT | Refills: 0 | Status: SHIPPED | OUTPATIENT
Start: 2022-11-07

## 2023-03-16 DIAGNOSIS — E06.3 HYPOTHYROIDISM DUE TO HASHIMOTO'S THYROIDITIS: ICD-10-CM

## 2023-03-16 DIAGNOSIS — E03.8 HYPOTHYROIDISM DUE TO HASHIMOTO'S THYROIDITIS: ICD-10-CM

## 2023-03-17 RX ORDER — LEVOTHYROXINE SODIUM 200 MCG
TABLET ORAL
Qty: 90 TABLET | Refills: 0 | Status: SHIPPED | OUTPATIENT
Start: 2023-03-17

## 2023-03-22 ENCOUNTER — TELEPHONE (OUTPATIENT)
Dept: ENDOCRINOLOGY | Facility: CLINIC | Age: 48
End: 2023-03-22
Payer: COMMERCIAL

## 2023-03-22 NOTE — TELEPHONE ENCOUNTER
Patient called requesting lab orders be placed so she can have done prior to her follow up. She will come to a [facility] facility to have done, but please give her a call once placed.

## 2023-03-24 DIAGNOSIS — E28.2 PCOS (POLYCYSTIC OVARIAN SYNDROME): ICD-10-CM

## 2023-03-24 DIAGNOSIS — E55.9 VITAMIN D DEFICIENCY: ICD-10-CM

## 2023-03-24 DIAGNOSIS — R73.03 PREDIABETES: ICD-10-CM

## 2023-03-24 DIAGNOSIS — E03.8 HYPOTHYROIDISM DUE TO HASHIMOTO'S THYROIDITIS: Primary | ICD-10-CM

## 2023-03-24 DIAGNOSIS — E78.5 HYPERLIPIDEMIA, UNSPECIFIED HYPERLIPIDEMIA TYPE: ICD-10-CM

## 2023-03-24 DIAGNOSIS — E06.3 HYPOTHYROIDISM DUE TO HASHIMOTO'S THYROIDITIS: Primary | ICD-10-CM

## 2023-03-30 ENCOUNTER — LAB (OUTPATIENT)
Dept: LAB | Facility: HOSPITAL | Age: 48
End: 2023-03-30
Payer: COMMERCIAL

## 2023-03-30 DIAGNOSIS — E55.9 VITAMIN D DEFICIENCY: ICD-10-CM

## 2023-03-30 DIAGNOSIS — E28.2 PCOS (POLYCYSTIC OVARIAN SYNDROME): ICD-10-CM

## 2023-03-30 DIAGNOSIS — R73.03 PREDIABETES: ICD-10-CM

## 2023-03-30 DIAGNOSIS — E03.8 HYPOTHYROIDISM DUE TO HASHIMOTO'S THYROIDITIS: ICD-10-CM

## 2023-03-30 DIAGNOSIS — E78.5 HYPERLIPIDEMIA, UNSPECIFIED HYPERLIPIDEMIA TYPE: ICD-10-CM

## 2023-03-30 DIAGNOSIS — E06.3 HYPOTHYROIDISM DUE TO HASHIMOTO'S THYROIDITIS: ICD-10-CM

## 2023-03-30 LAB
ALBUMIN SERPL-MCNC: 4.2 G/DL (ref 3.5–5.2)
ALBUMIN/GLOB SERPL: 1.4 G/DL
ALP SERPL-CCNC: 49 U/L (ref 39–117)
ALT SERPL W P-5'-P-CCNC: 19 U/L (ref 1–33)
ANION GAP SERPL CALCULATED.3IONS-SCNC: 9 MMOL/L (ref 5–15)
AST SERPL-CCNC: 18 U/L (ref 1–32)
BILIRUB SERPL-MCNC: <0.2 MG/DL (ref 0–1.2)
BUN SERPL-MCNC: 13 MG/DL (ref 6–20)
BUN/CREAT SERPL: 18.3 (ref 7–25)
CALCIUM SPEC-SCNC: 9.4 MG/DL (ref 8.6–10.5)
CHLORIDE SERPL-SCNC: 102 MMOL/L (ref 98–107)
CHOLEST SERPL-MCNC: 156 MG/DL (ref 0–200)
CO2 SERPL-SCNC: 30 MMOL/L (ref 22–29)
CREAT SERPL-MCNC: 0.71 MG/DL (ref 0.57–1)
EGFRCR SERPLBLD CKD-EPI 2021: 104.4 ML/MIN/1.73
GLOBULIN UR ELPH-MCNC: 3.1 GM/DL
GLUCOSE SERPL-MCNC: 108 MG/DL (ref 65–99)
HDLC SERPL-MCNC: 38 MG/DL (ref 40–60)
LDLC SERPL CALC-MCNC: 81 MG/DL (ref 0–100)
LDLC/HDLC SERPL: 1.94 {RATIO}
POTASSIUM SERPL-SCNC: 3.7 MMOL/L (ref 3.5–5.2)
PROT SERPL-MCNC: 7.3 G/DL (ref 6–8.5)
SODIUM SERPL-SCNC: 141 MMOL/L (ref 136–145)
TRIGL SERPL-MCNC: 222 MG/DL (ref 0–150)
VLDLC SERPL-MCNC: 37 MG/DL (ref 5–40)

## 2023-03-30 PROCEDURE — 83036 HEMOGLOBIN GLYCOSYLATED A1C: CPT

## 2023-03-30 PROCEDURE — 80053 COMPREHEN METABOLIC PANEL: CPT

## 2023-03-30 PROCEDURE — 82306 VITAMIN D 25 HYDROXY: CPT

## 2023-03-30 PROCEDURE — 84443 ASSAY THYROID STIM HORMONE: CPT

## 2023-03-30 PROCEDURE — 80061 LIPID PANEL: CPT

## 2023-03-30 PROCEDURE — 84439 ASSAY OF FREE THYROXINE: CPT

## 2023-03-31 LAB
25(OH)D3 SERPL-MCNC: 37.7 NG/ML (ref 30–100)
HBA1C MFR BLD: 6 % (ref 4.8–5.6)
T4 FREE SERPL-MCNC: 1.52 NG/DL (ref 0.93–1.7)
TSH SERPL DL<=0.05 MIU/L-ACNC: 1.37 UIU/ML (ref 0.27–4.2)

## 2023-05-02 ENCOUNTER — OFFICE VISIT (OUTPATIENT)
Dept: ENDOCRINOLOGY | Facility: CLINIC | Age: 48
End: 2023-05-02
Payer: COMMERCIAL

## 2023-05-02 VITALS
SYSTOLIC BLOOD PRESSURE: 118 MMHG | BODY MASS INDEX: 51.91 KG/M2 | HEART RATE: 65 BPM | WEIGHT: 293 LBS | HEIGHT: 63 IN | OXYGEN SATURATION: 100 % | DIASTOLIC BLOOD PRESSURE: 64 MMHG

## 2023-05-02 DIAGNOSIS — E28.2 PCOS (POLYCYSTIC OVARIAN SYNDROME): ICD-10-CM

## 2023-05-02 DIAGNOSIS — E03.8 HYPOTHYROIDISM DUE TO HASHIMOTO'S THYROIDITIS: Primary | ICD-10-CM

## 2023-05-02 DIAGNOSIS — E06.3 HYPOTHYROIDISM DUE TO HASHIMOTO'S THYROIDITIS: Primary | ICD-10-CM

## 2023-05-02 LAB
CRP SERPL-MCNC: 0.57 MG/DL (ref 0–0.5)
T4 FREE SERPL-MCNC: 2.03 NG/DL (ref 0.93–1.7)
TSH SERPL DL<=0.05 MIU/L-ACNC: 1.21 UIU/ML (ref 0.27–4.2)

## 2023-05-02 PROCEDURE — 36415 COLL VENOUS BLD VENIPUNCTURE: CPT | Performed by: INTERNAL MEDICINE

## 2023-05-02 PROCEDURE — 84439 ASSAY OF FREE THYROXINE: CPT | Performed by: INTERNAL MEDICINE

## 2023-05-02 PROCEDURE — 99214 OFFICE O/P EST MOD 30 MIN: CPT | Performed by: INTERNAL MEDICINE

## 2023-05-02 PROCEDURE — 86140 C-REACTIVE PROTEIN: CPT | Performed by: INTERNAL MEDICINE

## 2023-05-02 PROCEDURE — 84443 ASSAY THYROID STIM HORMONE: CPT | Performed by: INTERNAL MEDICINE

## 2023-05-02 RX ORDER — EPINEPHRINE 0.3 MG/.3ML
INJECTION SUBCUTANEOUS
Qty: 2 EACH | Refills: 11 | Status: SHIPPED | OUTPATIENT
Start: 2023-05-02

## 2023-05-02 NOTE — PROGRESS NOTES
Chief complaint  Hypothyroidism    Subjective   Kanwal Bruner is a 47 y.o. female is here today for follow-up.   PCOS - Clinical features include oligomenorrhea and insulin resistance.   Metformin caused diarrhea and heart palpitations.   Trial of Ozempic resulted in constipation.   Symptoms: acne, skin darkening, weight gain.            Prediabetes with A1C of 6. She follows the diet.Achieved weight loss on low carb diet.     Hypothyroidism. The patient has Hashimoto's thyroiditis dx 2004. Current treatment includes levothyroxine (Synthroid)  212 mcg daily.     Vit D deficiency  She experienced side effects of diarrhea with over-the-counter vitamin D supplements.  Trial of replasta resulted in good tolerance and she is taking it currently    She has started a low carb die and lost 20 lbs. Feels better     Medications    Current Outpatient Medications:   •  EPINEPHrine (EPIPEN) 0.3 MG/0.3ML solution auto-injector injection, Inject 0.3 ML into the shoulder, thigh, or buttocks for 1 dose., Disp: 2 each, Rfl: 11  •  Apple Cider Vinegar 300 MG tablet, Take  by mouth., Disp: , Rfl:   •  Ascorbic Acid Buffered (Buffered Vitamin C) 1000 MG capsule, , Disp: , Rfl:   •  ASHWAGANDHA PO, Take  by mouth., Disp: , Rfl:   •  Blood Glucose Monitoring Suppl (OneTouch Verio) w/Device kit, 1 each Daily., Disp: 1 kit, Rfl: 0  •  Cholecalciferol (Replesta) 1.25 MG (95081 UT) wafer, Take 1 wafer by mouth 1 (One) Time Per Week., Disp: 4 wafer, Rfl: 11  •  glucose blood (OneTouch Verio) test strip, 1 each by Other route As Needed (glucose monitoring.). Use as instructed, Disp: 50 each, Rfl: 11  •  levocetirizine (XYZAL) 5 MG tablet, Take 1 tablet by mouth Daily., Disp: , Rfl:   •  Multiple Vitamins-Minerals (EMERGEN-C SUPER FRUIT PO), Take  by mouth., Disp: , Rfl:   •  Synthroid 200 MCG tablet, TAKE 1 TABLET DAILY FOR HYPOTHYROIDISM, Disp: 90 tablet, Rfl: 0  •  Synthroid 25 MCG tablet, Take 0.5 tablets by mouth Daily., Disp: 90  "tablet, Rfl: 3  •  Zinc 50 MG capsule, , Disp: , Rfl:     Review of systems  Review of Systems   Constitutional: Positive for fatigue.   HENT: Negative for ear pain.    Skin:        Hair loss   Neurological: Positive for weakness.       Physical exam  Objective   Blood pressure 118/64, pulse 65, height 160 cm (63\"), weight (!) 142 kg (312 lb), SpO2 100 %, not currently breastfeeding. Body mass index is 55.27 kg/m².  Physical Exam   Constitutional: She is oriented to person, place, and time. She appears well-developed.   Morbidly obese   HENT:   Head: Normocephalic and atraumatic.   Eyes: Conjunctivae are normal.   Neck: No thyromegaly present.   Cardiovascular: Normal rate, regular rhythm, normal heart sounds and normal pulses.   Pulmonary/Chest: Effort normal and breath sounds normal.   Neurological: She is alert and oriented to person, place, and time.   Psychiatric: Thought content normal.   Vitals reviewed.        Assessment  Assessment & Plan   Problems Addressed this Visit        Other    Hypothyroidism due to Hashimoto's thyroiditis - Primary    Relevant Orders    TSH    T4, Free    C-reactive Protein    PCOS (polycystic ovarian syndrome)   Diagnoses       Codes Comments    Hypothyroidism due to Hashimoto's thyroiditis    -  Primary ICD-10-CM: E03.8, E06.3  ICD-9-CM: 244.8, 245.2     PCOS (polycystic ovarian syndrome)     ICD-10-CM: E28.2  ICD-9-CM: 256.4           Plan    -Synthroid 212 mcg. Repeat TFT, consider simplifying the regimen. She is currently taking 200 mcg + 1/2 of 25 mcg       -Vit D deficiency - cont Replesta - 14 000 IU twice weekly.      -A1C is 6. Trial of Ozempic was not tolerated well and she stopped it.    Follow-up in 4-6 months.  "

## 2023-05-31 NOTE — TELEPHONE ENCOUNTER
Rx Refill Note  Requested Prescriptions     Pending Prescriptions Disp Refills   • Synthroid 25 MCG tablet 90 tablet 3     Sig: Take 0.5 tablets by mouth Daily.      Last office visit with prescribing clinician: 5/2/2023   Last telemedicine visit with prescribing clinician: Visit date not found   Next office visit with prescribing clinician: 11/8/2023                         Brandie Orona CMA  05/31/23, 11:14 EDT

## 2023-06-01 RX ORDER — LEVOTHYROXINE SODIUM 25 MCG
12.5 TABLET ORAL DAILY
Qty: 90 TABLET | Refills: 3 | Status: SHIPPED | OUTPATIENT
Start: 2023-06-01

## 2023-09-02 DIAGNOSIS — E06.3 HYPOTHYROIDISM DUE TO HASHIMOTO'S THYROIDITIS: ICD-10-CM

## 2023-09-02 DIAGNOSIS — E03.8 HYPOTHYROIDISM DUE TO HASHIMOTO'S THYROIDITIS: ICD-10-CM

## 2023-09-05 RX ORDER — LEVOTHYROXINE SODIUM 200 MCG
TABLET ORAL
Qty: 90 TABLET | Refills: 1 | Status: SHIPPED | OUTPATIENT
Start: 2023-09-05

## 2023-09-05 NOTE — TELEPHONE ENCOUNTER
Rx Refill Note  Requested Prescriptions     Pending Prescriptions Disp Refills    Synthroid 200 MCG tablet [Pharmacy Med Name: SYNTHROID    TAB 200MCG] 90 tablet 0     Sig: TAKE 1 TABLET DAILY FOR HYPOTHYROIDISM (NEED AN APPOINTMENT)      Last office visit with prescribing clinician: 5/2/2023   Last telemedicine visit with prescribing clinician: Visit date not found   Next office visit with prescribing clinician: 11/8/2023                         Would you like a call back once the refill request has been completed: [] Yes [] No    If the office needs to give you a call back, can they leave a voicemail: [] Yes [] No    Brandie Orona CMA  09/05/23, 07:41 EDT

## 2023-11-08 ENCOUNTER — OFFICE VISIT (OUTPATIENT)
Dept: ENDOCRINOLOGY | Facility: CLINIC | Age: 48
End: 2023-11-08
Payer: COMMERCIAL

## 2023-11-08 VITALS
BODY MASS INDEX: 51.91 KG/M2 | HEIGHT: 63 IN | WEIGHT: 293 LBS | OXYGEN SATURATION: 98 % | SYSTOLIC BLOOD PRESSURE: 122 MMHG | HEART RATE: 85 BPM | DIASTOLIC BLOOD PRESSURE: 60 MMHG

## 2023-11-08 DIAGNOSIS — E06.3 HYPOTHYROIDISM DUE TO HASHIMOTO'S THYROIDITIS: Primary | ICD-10-CM

## 2023-11-08 DIAGNOSIS — E03.8 HYPOTHYROIDISM DUE TO HASHIMOTO'S THYROIDITIS: Primary | ICD-10-CM

## 2023-11-08 DIAGNOSIS — E28.2 PCOS (POLYCYSTIC OVARIAN SYNDROME): ICD-10-CM

## 2023-11-08 DIAGNOSIS — E55.9 VITAMIN D DEFICIENCY: ICD-10-CM

## 2023-11-08 LAB
25(OH)D3 SERPL-MCNC: 36.9 NG/ML (ref 30–100)
ALBUMIN SERPL-MCNC: 4.3 G/DL (ref 3.5–5.2)
ALBUMIN/GLOB SERPL: 1.6 G/DL
ALP SERPL-CCNC: 53 U/L (ref 39–117)
ALT SERPL W P-5'-P-CCNC: 28 U/L (ref 1–33)
ANION GAP SERPL CALCULATED.3IONS-SCNC: 9.2 MMOL/L (ref 5–15)
AST SERPL-CCNC: 21 U/L (ref 1–32)
BILIRUB SERPL-MCNC: 0.3 MG/DL (ref 0–1.2)
BUN SERPL-MCNC: 17 MG/DL (ref 6–20)
BUN/CREAT SERPL: 24.6 (ref 7–25)
CALCIUM SPEC-SCNC: 9.2 MG/DL (ref 8.6–10.5)
CHLORIDE SERPL-SCNC: 104 MMOL/L (ref 98–107)
CO2 SERPL-SCNC: 26.8 MMOL/L (ref 22–29)
CREAT SERPL-MCNC: 0.69 MG/DL (ref 0.57–1)
EGFRCR SERPLBLD CKD-EPI 2021: 105.9 ML/MIN/1.73
GLOBULIN UR ELPH-MCNC: 2.7 GM/DL
GLUCOSE SERPL-MCNC: 116 MG/DL (ref 65–99)
HBA1C MFR BLD: 5.8 % (ref 4.8–5.6)
POTASSIUM SERPL-SCNC: 4 MMOL/L (ref 3.5–5.2)
PROT SERPL-MCNC: 7 G/DL (ref 6–8.5)
SODIUM SERPL-SCNC: 140 MMOL/L (ref 136–145)
T4 FREE SERPL-MCNC: 1.51 NG/DL (ref 0.93–1.7)
TSH SERPL DL<=0.05 MIU/L-ACNC: 1.81 UIU/ML (ref 0.27–4.2)

## 2023-11-08 PROCEDURE — 83036 HEMOGLOBIN GLYCOSYLATED A1C: CPT | Performed by: INTERNAL MEDICINE

## 2023-11-08 PROCEDURE — 99214 OFFICE O/P EST MOD 30 MIN: CPT | Performed by: INTERNAL MEDICINE

## 2023-11-08 PROCEDURE — 84443 ASSAY THYROID STIM HORMONE: CPT | Performed by: INTERNAL MEDICINE

## 2023-11-08 PROCEDURE — 84439 ASSAY OF FREE THYROXINE: CPT | Performed by: INTERNAL MEDICINE

## 2023-11-08 PROCEDURE — 82306 VITAMIN D 25 HYDROXY: CPT | Performed by: INTERNAL MEDICINE

## 2023-11-08 PROCEDURE — 80053 COMPREHEN METABOLIC PANEL: CPT | Performed by: INTERNAL MEDICINE

## 2023-11-08 NOTE — PROGRESS NOTES
Chief complaint  Hypothyroidism    Subjective   Kanwal Bruner is a 48 y.o. female is here today for follow-up.   PCOS - Clinical features include oligomenorrhea and insulin resistance.   Metformin caused diarrhea and heart palpitations.   Trial of Ozempic resulted in constipation.   Symptoms: acne, skin darkening, weight gain.          Prediabetes with A1C of 6. She follows the diet. Achieved weight loss on low carb diet.     Hypothyroidism. The patient has Hashimoto's thyroiditis dx 2004. Current treatment includes levothyroxine (Synthroid)  212 mcg daily.  She has tried decreasing the dose and not felt well. She has a lot of 25 mcg left      Vit D deficiency  She experienced side effects of diarrhea with over-the-counter vitamin D supplements.  Trial of replasta resulted in good tolerance and she is taking it currently    She has started a low carb diet and lost 20 lbs, now regained some of the weight back.      Medications    Current Outpatient Medications:     Apple Cider Vinegar 300 MG tablet, Take  by mouth., Disp: , Rfl:     Ascorbic Acid Buffered (Buffered Vitamin C) 1000 MG capsule, , Disp: , Rfl:     ASHWAGANDHA PO, Take  by mouth., Disp: , Rfl:     Blood Glucose Monitoring Suppl (OneTouch Verio) w/Device kit, 1 each Daily., Disp: 1 kit, Rfl: 0    Cholecalciferol (Replesta) 1.25 MG (66090 UT) wafer, Take 1 wafer by mouth 1 (One) Time Per Week., Disp: 4 wafer, Rfl: 11    EPINEPHrine (EPIPEN) 0.3 MG/0.3ML solution auto-injector injection, Inject 0.3 ML into the shoulder, thigh, or buttocks for 1 dose., Disp: 2 each, Rfl: 11    glucose blood (OneTouch Verio) test strip, 1 each by Other route As Needed (glucose monitoring.). Use as instructed, Disp: 50 each, Rfl: 11    levocetirizine (XYZAL) 5 MG tablet, Take 1 tablet by mouth Daily., Disp: , Rfl:     Multiple Vitamins-Minerals (EMERGEN-C SUPER FRUIT PO), Take  by mouth., Disp: , Rfl:     Synthroid 200 MCG tablet, TAKE 1 TABLET DAILY FOR HYPOTHYROIDISM  "(NEED AN APPOINTMENT), Disp: 90 tablet, Rfl: 1    Synthroid 25 MCG tablet, Take 0.5 tablets by mouth Daily., Disp: 90 tablet, Rfl: 3    Zinc 50 MG capsule, , Disp: , Rfl:     Review of systems  Review of Systems   Constitutional:  Positive for fatigue.   HENT:  Negative for ear pain.    Skin:         Hair loss   Neurological:  Positive for weakness.       Physical exam  Objective   Blood pressure 122/60, pulse 85, height 160 cm (62.99\"), weight (!) 143 kg (316 lb), SpO2 98%, not currently breastfeeding. Body mass index is 55.99 kg/m².  Physical Exam   Constitutional: She is oriented to person, place, and time. She appears well-developed.   Morbidly obese   HENT:   Head: Normocephalic and atraumatic.   Eyes: Conjunctivae are normal.   Neck: No thyromegaly present.   Cardiovascular: Normal rate, regular rhythm, normal heart sounds and normal pulses.   Pulmonary/Chest: Effort normal and breath sounds normal.   Neurological: She is alert and oriented to person, place, and time.   Psychiatric: Thought content normal.   Vitals reviewed.        Assessment  Assessment & Plan   Problems Addressed this Visit          Other    Hypothyroidism due to Hashimoto's thyroiditis - Primary    PCOS (polycystic ovarian syndrome)     Diagnoses         Codes Comments    Hypothyroidism due to Hashimoto's thyroiditis    -  Primary ICD-10-CM: E03.8, E06.3  ICD-9-CM: 244.8, 245.2     PCOS (polycystic ovarian syndrome)     ICD-10-CM: E28.2  ICD-9-CM: 256.4             Plan  -Synthroid 212 mcg. Repeat TFT, consider simplifying the regimen. She is currently taking 200 mcg + 1/2 of 25 mcg.   I will review labs and consider decreasing to SYnthroid 200 mcg. Her free T4 was on the high side last time and she is not feeling any different after reducing by 12.5 mcg.       -Vit D deficiency - cont Replesta - 14 000 IU twice weekly.      -A1C is 6. Trial of Ozempic was not tolerated well and she stopped it.    Follow-up in 6 months.  "

## 2023-12-05 ENCOUNTER — OFFICE VISIT (OUTPATIENT)
Dept: INTERNAL MEDICINE | Facility: CLINIC | Age: 48
End: 2023-12-05
Payer: COMMERCIAL

## 2023-12-05 VITALS
BODY MASS INDEX: 51.91 KG/M2 | TEMPERATURE: 97.6 F | DIASTOLIC BLOOD PRESSURE: 72 MMHG | OXYGEN SATURATION: 99 % | WEIGHT: 293 LBS | HEIGHT: 63 IN | SYSTOLIC BLOOD PRESSURE: 126 MMHG | HEART RATE: 62 BPM

## 2023-12-05 DIAGNOSIS — Z12.31 ENCOUNTER FOR SCREENING MAMMOGRAM FOR MALIGNANT NEOPLASM OF BREAST: ICD-10-CM

## 2023-12-05 DIAGNOSIS — E66.01 MORBID OBESITY: ICD-10-CM

## 2023-12-05 DIAGNOSIS — Z12.11 SCREENING FOR MALIGNANT NEOPLASM OF COLON: ICD-10-CM

## 2023-12-05 DIAGNOSIS — Z23 ENCOUNTER FOR IMMUNIZATION: ICD-10-CM

## 2023-12-05 DIAGNOSIS — Z00.00 ENCOUNTER FOR ANNUAL PHYSICAL EXAM: Primary | ICD-10-CM

## 2023-12-05 RX ORDER — ZOSTER VACCINE RECOMBINANT, ADJUVANTED 50 MCG/0.5
0.5 KIT INTRAMUSCULAR ONCE
Qty: 0.5 ML | Refills: 0 | Status: SHIPPED | OUTPATIENT
Start: 2023-12-05 | End: 2023-12-05

## 2023-12-05 NOTE — PROGRESS NOTES
Chief Complaint  Annual Exam    Subjective   History of Present Illness:  Kanwal Bruner is a 48 y.o. female who presents today for a physical exam and health maintenance evaluation.     Past Medical History:   Diagnosis Date    Abnormal liver function test 2016    Acute bronchitis 10/11/2016    Bell's palsy     GERD (gastroesophageal reflux disease)     Hashimoto's disease     History of gallstones     History of ovarian cyst     Lactose intolerance     Microscopic hematuria 2017    Temporary high blood pressure 2017    Thyroid disease        Past Surgical History:   Procedure Laterality Date    ADENOIDECTOMY      BREAST BIOPSY Right 2013    Ultrasound guided     SECTION      2009 & 2005    CHOLECYSTECTOMY  1998    HYSTERECTOMY  2015    OOPHORECTOMY Right     TYMPANOSTOMY TUBE PLACEMENT         Social History     Socioeconomic History    Marital status:    Tobacco Use    Smoking status: Never    Smokeless tobacco: Never   Vaping Use    Vaping Use: Never used   Substance and Sexual Activity    Alcohol use: No    Sexual activity: Defer         Current Outpatient Medications:     Apple Cider Vinegar 300 MG tablet, Take  by mouth., Disp: , Rfl:     Ascorbic Acid Buffered (Buffered Vitamin C) 1000 MG capsule, , Disp: , Rfl:     ASHWAGANDHA PO, Take  by mouth., Disp: , Rfl:     Blood Glucose Monitoring Suppl (OneTouch Verio) w/Device kit, 1 each Daily., Disp: 1 kit, Rfl: 0    Cholecalciferol (Replesta) 1.25 MG (81661 UT) wafer, Take 1 wafer by mouth 1 (One) Time Per Week., Disp: 4 wafer, Rfl: 11    EPINEPHrine (EPIPEN) 0.3 MG/0.3ML solution auto-injector injection, Inject 0.3 ML into the shoulder, thigh, or buttocks for 1 dose., Disp: 2 each, Rfl: 11    glucose blood (OneTouch Verio) test strip, 1 each by Other route As Needed (glucose monitoring.). Use as instructed, Disp: 50 each, Rfl: 11    levocetirizine (XYZAL) 5 MG tablet, Take 1 tablet by mouth Daily., Disp: , Rfl:  "    Multiple Vitamins-Minerals (EMERGEN-C SUPER FRUIT PO), Take  by mouth., Disp: , Rfl:     Synthroid 200 MCG tablet, TAKE 1 TABLET DAILY FOR HYPOTHYROIDISM (NEED AN APPOINTMENT), Disp: 90 tablet, Rfl: 1    Zinc 50 MG capsule, , Disp: , Rfl:     General History  Kanwal  does have regular dental visits.  She does not complain of vision problems. Last eye exam was 1 year ago.  Immunizations are not up to date. The patient needs the following immunizations: Shingrix and influenza    Lifestyle  Kanwal  consumes in general, a \"healthy\" diet  .  She exercises rarely. She says she wants to change her diet. Recently was on the carnivi3 membrane diet and felt better.     Reproductive Health  Kanwal  is perimenopausal.  She reports periods are rarely. She had CONSUELO and right ovary removed in 2015.  She is sexually active. Her contraceptive plan is no method.  She complains of some sleepless nights, and hot flashes.    Screening  Last pap was 2015  Last Completed Pap Smear            Discontinued - PAP SMEAR  Discontinued      No completion history exists for this topic.                . History of abnormal pap smear or family history of gyn cancer: no  Last mammogram was 2021.  Last Completed Mammogram       This patient has no relevant Health Maintenance data.        . Personal or family history of abnormal mammograms or breast cancer: Self had cysts, mother had cysts, and maternal aunt has a history of breast cancer.  Last colonoscopy was never.  Last Completed Colonoscopy       This patient has no relevant Health Maintenance data.        . Family history of colon cancer: None  Last DEXA (if applicable) was None.    Health Maintenance Summary            Ordered - COLORECTAL CANCER SCREENING (COLONOSCOPY - Every 10 Years) Ordered on 12/5/2023      No completion, postpone, or frequency change history exists for this topic.              Overdue - HEPATITIS C SCREENING (Once) Never done      No completion, postpone, or " frequency change history exists for this topic.              Overdue - ANNUAL PHYSICAL (Yearly) Never done      No completion, postpone, or frequency change history exists for this topic.              Ordered - MAMMOGRAM (Every 2 Years) Ordered on 12/5/2023 09/13/2021  Mammo Screening Digital Tomosynthesis Bilateral With CAD    01/14/2020  Mammo Screening Digital Tomosynthesis Bilateral With CAD    12/21/2018  Mammo Screening Digital Tomosynthesis Bilateral With CAD    09/08/2017  Mammo Screening Digital Tomosynthesis Bilateral With CAD    09/07/2016  Mammo screening digital tomosynthesis bilateral w cad    Only the first 5 history entries have been loaded, but more history exists.              Overdue - ZOSTER VACCINE (1 of 2) Never done      No completion, postpone, or frequency change history exists for this topic.              Postponed - COVID-19 Vaccine (2 - 2023-24 season) Postponed until 12/7/2023 12/05/2023  Postponed until 12/7/2023 by Linda Marie MA (Patient Refused)    07/05/2023  Postponed until 7/9/2024 by Linda Greco MA (Patient Refused)    04/03/2021  Imm Admin: COVID-19 ([facility])              Postponed - INFLUENZA VACCINE (Yearly - August to March) Postponed until 3/31/2024      12/05/2023  Postponed until 3/31/2024 by Linda Marie MA (Patient Refused)    09/23/2020  Imm Admin: Flu Vaccine Intradermal Quad 18-64YR    09/23/2020  Imm Admin: Fluzone (or Fluarix & Flulaval for VFC) >6mos    09/25/2019  Imm Admin: flucelvax quad pfs =>4 YRS    10/11/2018  Done    Only the first 5 history entries have been loaded, but more history exists.              LIPID PANEL (Yearly) Next due on 3/30/2024      03/30/2023  Lipid Panel    11/01/2022  Lipid Panel    09/09/2021  Lipid Panel    09/13/2019  Lipid Panel    03/22/2019  Lipid Panel    Only the first 5 history entries have been loaded, but more history exists.              BMI FOLLOWUP (Yearly) Next due on 12/5/2024 12/05/2023   "[employer]: WORKFLOW - QUALITY MEASUREMENT - DOCUMENTED WEIGHT FOLLOW-UP PLAN    12/05/2023  [employer]: WORKFLOW - QUALITY MEASUREMENT - BMI FOLLOW UP CARE PLAN DOCUMENTED              TDAP/TD VACCINES (2 - Td or Tdap) Next due on 6/24/2032 06/24/2022  Imm Admin: Tdap              Pneumococcal Vaccine 0-64 (Series Information) Aged Out      No completion, postpone, or frequency change history exists for this topic.              Discontinued - PAP SMEAR  Discontinued      09/25/2019  Frequency changed to Never by Stephanie Haywood MD (S/p hysterectomy, no cervix)                  Immunization History   Administered Date(s) Administered    COVID-19 ([employer]) 04/03/2021    Flu Vaccine Intradermal Quad 18-64YR 09/23/2020    Fluzone (or Fluarix & Flulaval for VFC) >6mos 10/11/2018, 09/23/2020    Hepatitis A 11/02/2018, 10/04/2019    Tdap 06/24/2022    flucelvax quad pfs =>4 YRS 09/25/2019    influenza Split 10/11/2016       Review of Systems   All other systems reviewed and are negative.         Objective   Vital Signs  /72   Pulse 62   Temp 97.6 °F (36.4 °C)   Ht 160 cm (62.99\")   Wt (!) 146 kg (322 lb)   SpO2 99%   BMI 57.06 kg/m²   Estimated body mass index is 57.06 kg/m² as calculated from the following:    Height as of this encounter: 160 cm (62.99\").    Weight as of this encounter: 146 kg (322 lb).    Class 3 Severe Obesity (BMI >=40). Obesity-related health conditions include the following: dyslipidemias. Obesity is worsening. BMI is is above average; BMI management plan is completed. We discussed portion control, increasing exercise, Information on healthy weight added to patient's after visit summary, and carnivore diet .      Physical Exam  Vitals reviewed.   Constitutional:       General: She is not in acute distress.     Appearance: She is obese.   HENT:      Head: Normocephalic and atraumatic.   Eyes:      Conjunctiva/sclera: Conjunctivae normal.   Cardiovascular:      Rate and Rhythm: " Normal rate and regular rhythm.      Pulses: Normal pulses.      Heart sounds: Normal heart sounds.   Pulmonary:      Effort: Pulmonary effort is normal.      Breath sounds: Normal breath sounds.   Musculoskeletal:      Cervical back: Normal range of motion and neck supple.   Skin:     General: Skin is warm and dry.   Neurological:      General: No focal deficit present.      Mental Status: She is alert.   Psychiatric:         Mood and Affect: Mood normal.         Behavior: Behavior normal.         Thought Content: Thought content normal.         Judgment: Judgment normal.             Diagnoses and all orders for this visit:    1. Encounter for annual physical exam (Primary)  Assessment & Plan:  Declined influenza vaccine.  Screenings per orders.  Return in 1 year for annual exam with Dr. Haywood.      2. Morbid obesity  Assessment & Plan:  Patient's (Body mass index is 57.06 kg/m².) indicates that they are morbidly/severely obese (BMI > 40 or > 35 with obesity - related health condition) with health conditions that include diabetes mellitus and dyslipidemias . Weight is worsening. BMI  is above average; BMI management plan is completed. We discussed portion control, increasing exercise, and carnivore diet .       3. Screening for malignant neoplasm of colon  -     Cologuard - Stool, Per Rectum; Future    4. Encounter for screening mammogram for malignant neoplasm of breast  -     Mammo screening digital tomosynthesis bilateral w CAD; Future        Follow Up  Return in 1 year (on 12/5/2024), or or as needed..    AMARA Iyer    Part of this note may be an electronic transcription/translation of spoken language to printed text using the Dragon Dictation System.

## 2023-12-05 NOTE — ASSESSMENT & PLAN NOTE
Patient's (Body mass index is 57.06 kg/m².) indicates that they are morbidly/severely obese (BMI > 40 or > 35 with obesity - related health condition) with health conditions that include diabetes mellitus and dyslipidemias . Weight is worsening. BMI  is above average; BMI management plan is completed. We discussed portion control, increasing exercise, and carnivore diet .

## 2023-12-05 NOTE — ASSESSMENT & PLAN NOTE
Declined influenza vaccine.  Screenings per orders.  Return in 1 year for annual exam with Dr. Haywood.

## 2024-04-18 ENCOUNTER — HOSPITAL ENCOUNTER (OUTPATIENT)
Dept: MAMMOGRAPHY | Facility: HOSPITAL | Age: 49
Discharge: HOME | End: 2024-04-18
Admitting: INTERNAL MEDICINE
Payer: COMMERCIAL

## 2024-04-18 DIAGNOSIS — Z12.31 ENCOUNTER FOR SCREENING MAMMOGRAM FOR MALIGNANT NEOPLASM OF BREAST: ICD-10-CM

## 2024-04-18 PROCEDURE — 77063 BREAST TOMOSYNTHESIS BI: CPT

## 2024-04-18 PROCEDURE — 77067 SCR MAMMO BI INCL CAD: CPT

## 2024-04-26 DIAGNOSIS — E06.3 HYPOTHYROIDISM DUE TO HASHIMOTO'S THYROIDITIS: ICD-10-CM

## 2024-04-26 DIAGNOSIS — E03.8 HYPOTHYROIDISM DUE TO HASHIMOTO'S THYROIDITIS: ICD-10-CM

## 2024-04-26 RX ORDER — LEVOTHYROXINE SODIUM 200 MCG
TABLET ORAL
Qty: 90 TABLET | Refills: 1 | Status: SHIPPED | OUTPATIENT
Start: 2024-04-26

## 2024-04-26 NOTE — TELEPHONE ENCOUNTER
Rx Refill Note  Requested Prescriptions     Pending Prescriptions Disp Refills    Synthroid 200 MCG tablet [Pharmacy Med Name: SYNTHROID    TAB 200MCG] 90 tablet 1     Sig: TAKE 1 TABLET DAILY FOR HYPOTHYROIDISM (NEED AN APPOINTMENT)      Last office visit with prescribing clinician: 11/8/2023   Last telemedicine visit with prescribing clinician: Visit date not found   Next office visit with prescribing clinician: 5/1/2024                         Would you like a call back once the refill request has been completed: [] Yes [] No    If the office needs to give you a call back, can they leave a voicemail: [] Yes [] No    Shruti Barreto MA  04/26/24, 08:48 EDT

## 2024-05-01 ENCOUNTER — OFFICE VISIT (OUTPATIENT)
Dept: ENDOCRINOLOGY | Facility: CLINIC | Age: 49
End: 2024-05-01
Payer: COMMERCIAL

## 2024-05-01 VITALS
BODY MASS INDEX: 57.04 KG/M2 | SYSTOLIC BLOOD PRESSURE: 118 MMHG | WEIGHT: 293 LBS | DIASTOLIC BLOOD PRESSURE: 80 MMHG | OXYGEN SATURATION: 98 % | HEART RATE: 77 BPM

## 2024-05-01 DIAGNOSIS — E55.9 VITAMIN D DEFICIENCY: ICD-10-CM

## 2024-05-01 DIAGNOSIS — R73.03 PREDIABETES: ICD-10-CM

## 2024-05-01 DIAGNOSIS — E03.8 HYPOTHYROIDISM DUE TO HASHIMOTO'S THYROIDITIS: Primary | ICD-10-CM

## 2024-05-01 DIAGNOSIS — E06.3 HYPOTHYROIDISM DUE TO HASHIMOTO'S THYROIDITIS: Primary | ICD-10-CM

## 2024-05-01 LAB
25(OH)D3 SERPL-MCNC: 50.5 NG/ML (ref 30–100)
HBA1C MFR BLD: 6 % (ref 4.8–5.6)

## 2024-05-01 PROCEDURE — 84443 ASSAY THYROID STIM HORMONE: CPT | Performed by: INTERNAL MEDICINE

## 2024-05-01 PROCEDURE — 99214 OFFICE O/P EST MOD 30 MIN: CPT | Performed by: INTERNAL MEDICINE

## 2024-05-01 PROCEDURE — 84439 ASSAY OF FREE THYROXINE: CPT | Performed by: INTERNAL MEDICINE

## 2024-05-01 PROCEDURE — 36415 COLL VENOUS BLD VENIPUNCTURE: CPT | Performed by: INTERNAL MEDICINE

## 2024-05-01 PROCEDURE — 82306 VITAMIN D 25 HYDROXY: CPT | Performed by: INTERNAL MEDICINE

## 2024-05-01 PROCEDURE — 83036 HEMOGLOBIN GLYCOSYLATED A1C: CPT | Performed by: INTERNAL MEDICINE

## 2024-05-01 PROCEDURE — 80053 COMPREHEN METABOLIC PANEL: CPT | Performed by: INTERNAL MEDICINE

## 2024-05-01 RX ORDER — LEVOTHYROXINE SODIUM 200 MCG
200 TABLET ORAL DAILY
Qty: 90 TABLET | Refills: 3 | Status: SHIPPED | OUTPATIENT
Start: 2024-05-01

## 2024-05-01 NOTE — PROGRESS NOTES
Chief complaint  Hypothyroidism    Subjective   Kanwal Bruner is a 48 y.o. female is here today for follow-up.   PCOS - Clinical features include oligomenorrhea and insulin resistance.   Metformin caused diarrhea and heart palpitations.   Trial of Ozempic resulted in severe constipation.   Symptoms: acne, skin darkening, weight gain.          Prediabetes with A1C of 6. She follows the diet. Achieved weight loss on low carb diet.    Hypothyroidism. The patient has Hashimoto's thyroiditis dx 2004. Current treatment includes levothyroxine (Synthroid)  212 mcg daily.  She has tried decreasing the dose and not felt well. She has a lot of 25 mcg left and adds it to 200 mcg      Vit D deficiency  She experienced side effects of diarrhea with over-the-counter vitamin D supplements.  Trial of Replesta resulted in good tolerance and she is taking it currently    She is doing well on current regimen. The weight is stable. She has started the diet last week and hopes to achieve some weight loss.     Medications    Current Outpatient Medications:     Apple Cider Vinegar 300 MG tablet, Take  by mouth., Disp: , Rfl:     Ascorbic Acid Buffered (Buffered Vitamin C) 1000 MG capsule, , Disp: , Rfl:     BERBERINE CHLORIDE PO, Take 600 mg by mouth., Disp: , Rfl:     Cholecalciferol (Replesta) 1.25 MG (46858 UT) wafer, Take 1 wafer by mouth 1 (One) Time Per Week., Disp: 4 wafer, Rfl: 11    EPINEPHrine (EPIPEN) 0.3 MG/0.3ML solution auto-injector injection, Inject 0.3 ML into the shoulder, thigh, or buttocks for 1 dose., Disp: 2 each, Rfl: 11    levocetirizine (XYZAL) 5 MG tablet, Take 1 tablet by mouth Daily., Disp: , Rfl:     Synthroid 200 MCG tablet, Take 1 tablet by mouth Daily., Disp: 90 tablet, Rfl: 3    Zinc 50 MG capsule, , Disp: , Rfl:     ASHWAGANDHA PO, Take  by mouth. (Patient not taking: Reported on 5/1/2024), Disp: , Rfl:     Blood Glucose Monitoring Suppl (OneTouch Verio) w/Device kit, 1 each Daily. (Patient not  taking: Reported on 5/1/2024), Disp: 1 kit, Rfl: 0    glucose blood (OneTouch Verio) test strip, 1 each by Other route As Needed (glucose monitoring.). Use as instructed (Patient not taking: Reported on 5/1/2024), Disp: 50 each, Rfl: 11    Multiple Vitamins-Minerals (EMERGEN-C SUPER FRUIT PO), Take  by mouth. (Patient not taking: Reported on 5/1/2024), Disp: , Rfl:     Review of systems  Review of Systems   Constitutional:  Positive for fatigue.   HENT:  Negative for ear pain.    Skin:         Hair loss   Neurological:  Positive for weakness.       Physical exam  Objective   Vitals:    05/01/24 0959   BP: 118/80   BP Location: Left arm   Patient Position: Sitting   Pulse: 77   SpO2: 98%   Weight: (!) 146 kg (321 lb 14.4 oz)     Body mass index is 57.04 kg/m².  Physical Exam   Constitutional: She is oriented to person, place, and time. She appears well-developed.   Morbidly obese   HENT:   Head: Normocephalic and atraumatic.   Eyes: Conjunctivae are normal.   Neck: No thyromegaly present.   Cardiovascular: Normal rate, regular rhythm, normal heart sounds and normal pulses.   Pulmonary/Chest: Effort normal and breath sounds normal.   Neurological: She is alert and oriented to person, place, and time.   Psychiatric: Thought content normal.   Vitals reviewed.        Assessment  Assessment & Plan   Problems Addressed this Visit          Other    Hypothyroidism due to Hashimoto's thyroiditis - Primary    Relevant Medications    Synthroid 200 MCG tablet    Other Relevant Orders    Hemoglobin A1c    TSH    T4, Free    Comprehensive Metabolic Panel    Vitamin D,25-Hydroxy    Vitamin D deficiency    Prediabetes     Diagnoses         Codes Comments    Hypothyroidism due to Hashimoto's thyroiditis    -  Primary ICD-10-CM: E03.8, E06.3  ICD-9-CM: 244.8, 245.2     Vitamin D deficiency     ICD-10-CM: E55.9  ICD-9-CM: 268.9     Prediabetes     ICD-10-CM: R73.03  ICD-9-CM: 790.29             Plan  -Synthroid 212 mcg. She is  currently taking 200 mcg + 1/2 of 25 mcg.   I will review labs and consider decreasing to Synthroid 200 mcg.   She feels sick every time she reduces the dose to 200 mcg       -Vit D deficiency - cont Replesta - 14 000 IU twice weekly.      -A1C is 5.8, continue with low carb diet    Labs ordered    Follow-up in 6 months.

## 2024-05-02 LAB
ALBUMIN SERPL-MCNC: 4.4 G/DL (ref 3.5–5.2)
ALBUMIN/GLOB SERPL: 1.5 G/DL
ALP SERPL-CCNC: 47 U/L (ref 39–117)
ALT SERPL W P-5'-P-CCNC: 24 U/L (ref 1–33)
ANION GAP SERPL CALCULATED.3IONS-SCNC: 10 MMOL/L (ref 5–15)
AST SERPL-CCNC: 14 U/L (ref 1–32)
BILIRUB SERPL-MCNC: 0.2 MG/DL (ref 0–1.2)
BUN SERPL-MCNC: 16 MG/DL (ref 6–20)
BUN/CREAT SERPL: 19.8 (ref 7–25)
CALCIUM SPEC-SCNC: 9.3 MG/DL (ref 8.6–10.5)
CHLORIDE SERPL-SCNC: 103 MMOL/L (ref 98–107)
CO2 SERPL-SCNC: 27 MMOL/L (ref 22–29)
CREAT SERPL-MCNC: 0.81 MG/DL (ref 0.57–1)
EGFRCR SERPLBLD CKD-EPI 2021: 88.6 ML/MIN/1.73
GLOBULIN UR ELPH-MCNC: 2.9 GM/DL
GLUCOSE SERPL-MCNC: 103 MG/DL (ref 65–99)
POTASSIUM SERPL-SCNC: 4.2 MMOL/L (ref 3.5–5.2)
PROT SERPL-MCNC: 7.3 G/DL (ref 6–8.5)
SODIUM SERPL-SCNC: 140 MMOL/L (ref 136–145)
T4 FREE SERPL-MCNC: 1.7 NG/DL (ref 0.93–1.7)
TSH SERPL DL<=0.05 MIU/L-ACNC: 1.93 UIU/ML (ref 0.27–4.2)

## 2024-11-06 ENCOUNTER — OFFICE VISIT (OUTPATIENT)
Dept: ENDOCRINOLOGY | Facility: CLINIC | Age: 49
End: 2024-11-06
Payer: COMMERCIAL

## 2024-11-06 VITALS
DIASTOLIC BLOOD PRESSURE: 70 MMHG | BODY MASS INDEX: 51.91 KG/M2 | SYSTOLIC BLOOD PRESSURE: 130 MMHG | HEIGHT: 63 IN | HEART RATE: 86 BPM | WEIGHT: 293 LBS

## 2024-11-06 DIAGNOSIS — E55.9 VITAMIN D DEFICIENCY: ICD-10-CM

## 2024-11-06 DIAGNOSIS — E28.2 PCOS (POLYCYSTIC OVARIAN SYNDROME): ICD-10-CM

## 2024-11-06 DIAGNOSIS — E06.3 HYPOTHYROIDISM DUE TO HASHIMOTO'S THYROIDITIS: Primary | ICD-10-CM

## 2024-11-06 LAB
25(OH)D3 SERPL-MCNC: 43.5 NG/ML (ref 30–100)
ALBUMIN SERPL-MCNC: 3.9 G/DL (ref 3.5–5.2)
ALBUMIN/GLOB SERPL: 1.1 G/DL
ALP SERPL-CCNC: 57 U/L (ref 39–117)
ALT SERPL W P-5'-P-CCNC: 34 U/L (ref 1–33)
ANION GAP SERPL CALCULATED.3IONS-SCNC: 9.1 MMOL/L (ref 5–15)
AST SERPL-CCNC: 23 U/L (ref 1–32)
BILIRUB SERPL-MCNC: 0.2 MG/DL (ref 0–1.2)
BUN SERPL-MCNC: 17 MG/DL (ref 6–20)
BUN/CREAT SERPL: 13.2 (ref 7–25)
CALCIUM SPEC-SCNC: 9.5 MG/DL (ref 8.6–10.5)
CHLORIDE SERPL-SCNC: 106 MMOL/L (ref 98–107)
CO2 SERPL-SCNC: 26.9 MMOL/L (ref 22–29)
CREAT SERPL-MCNC: 1.29 MG/DL (ref 0.57–1)
EGFRCR SERPLBLD CKD-EPI 2021: 50.4 ML/MIN/1.73
GLOBULIN UR ELPH-MCNC: 3.7 GM/DL
GLUCOSE SERPL-MCNC: 91 MG/DL (ref 65–99)
HBA1C MFR BLD: 5.9 % (ref 4.8–5.6)
POTASSIUM SERPL-SCNC: 4.2 MMOL/L (ref 3.5–5.2)
PROT SERPL-MCNC: 7.6 G/DL (ref 6–8.5)
SODIUM SERPL-SCNC: 142 MMOL/L (ref 136–145)

## 2024-11-06 PROCEDURE — 83036 HEMOGLOBIN GLYCOSYLATED A1C: CPT | Performed by: INTERNAL MEDICINE

## 2024-11-06 PROCEDURE — 84439 ASSAY OF FREE THYROXINE: CPT | Performed by: INTERNAL MEDICINE

## 2024-11-06 PROCEDURE — 84443 ASSAY THYROID STIM HORMONE: CPT | Performed by: INTERNAL MEDICINE

## 2024-11-06 PROCEDURE — 82306 VITAMIN D 25 HYDROXY: CPT | Performed by: INTERNAL MEDICINE

## 2024-11-06 PROCEDURE — 36415 COLL VENOUS BLD VENIPUNCTURE: CPT | Performed by: INTERNAL MEDICINE

## 2024-11-06 PROCEDURE — 80053 COMPREHEN METABOLIC PANEL: CPT | Performed by: INTERNAL MEDICINE

## 2024-11-06 PROCEDURE — 99214 OFFICE O/P EST MOD 30 MIN: CPT | Performed by: INTERNAL MEDICINE

## 2024-11-06 RX ORDER — THIAMINE HCL 50 MG
50 TABLET ORAL DAILY
COMMUNITY

## 2024-11-06 RX ORDER — LEVOTHYROXINE SODIUM 25 MCG
25 TABLET ORAL DAILY
Qty: 90 TABLET | Refills: 3 | Status: SHIPPED | OUTPATIENT
Start: 2024-11-06

## 2024-11-06 NOTE — PROGRESS NOTES
Chief complaint  Hypothyroidism    Subjective   Kanwal Bruner is a 49 y.o. female is here today for follow-up.   PCOS - Clinical features include oligomenorrhea and insulin resistance.   Symptoms: acne, skin darkening, weight gain.      Prediabetes follows low carb diet. Metformin caused diarrhea and heart palpitations.   Trial of Ozempic resulted in severe constipation.     Hypothyroidism. The patient has Hashimoto's thyroiditis dx 2004. Current treatment includes levothyroxine (Synthroid)  212 mcg daily.  She has tried decreasing the dose  to 200 and did not feel well.    Vit D deficiency  Trial of Replesta resulted in good tolerance and she is taking it currently  She is taking Vit D more consistently - 65272 IU weekly. Last weeks she took 2-3 a week.     She is doing well on current regimen. The weight is stable.     Medications    Current Outpatient Medications:     Ascorbic Acid Buffered (Buffered Vitamin C) 1000 MG capsule, , Disp: , Rfl:     BERBERINE CHLORIDE PO, Take 600 mg by mouth., Disp: , Rfl:     Cholecalciferol (Replesta) 1.25 MG (99641 UT) wafer, Take 1 wafer by mouth 1 (One) Time Per Week., Disp: 4 wafer, Rfl: 11    EPINEPHrine (EPIPEN) 0.3 MG/0.3ML solution auto-injector injection, Inject 0.3 ML into the shoulder, thigh, or buttocks for 1 dose., Disp: 2 each, Rfl: 11    levocetirizine (XYZAL) 5 MG tablet, Take 1 tablet by mouth Daily., Disp: , Rfl:     Synthroid 200 MCG tablet, Take 1 tablet by mouth Daily., Disp: 90 tablet, Rfl: 3    Thiamine HCl (vitamin B-1) 50 MG tablet, Take 1 tablet by mouth Daily., Disp: , Rfl:     Zinc 50 MG capsule, , Disp: , Rfl:     Apple Cider Vinegar 300 MG tablet, Take  by mouth. (Patient not taking: Reported on 11/6/2024), Disp: , Rfl:     Review of systems  Review of Systems   Constitutional:  Positive for fatigue and unexpected weight change (weight gain).   HENT:  Negative for ear pain.    Skin:         Hair loss   Neurological:  Positive for weakness.  "      Physical exam  Objective   Vitals:    11/06/24 1117   BP: 130/70   Pulse: 86   Weight: (!) 146 kg (322 lb)   Height: 160 cm (62.99\")     Body mass index is 57.05 kg/m².  Physical Exam   Constitutional: She is oriented to person, place, and time. She appears well-developed.   Morbidly obese   HENT:   Head: Normocephalic and atraumatic.   Eyes: Conjunctivae are normal.   Neck: No thyromegaly present.   Cardiovascular: Normal rate, regular rhythm, normal heart sounds and normal pulses.   Pulmonary/Chest: Effort normal and breath sounds normal.   Neurological: She is alert and oriented to person, place, and time.   Psychiatric: Thought content normal.   Vitals reviewed.        Assessment  Assessment & Plan   Problems Addressed this Visit          Endocrine and Metabolic    Hypothyroidism due to Hashimoto's thyroiditis - Primary    PCOS (polycystic ovarian syndrome)    Vitamin D deficiency     Diagnoses         Codes Comments    Hypothyroidism due to Hashimoto's thyroiditis    -  Primary ICD-10-CM: E06.3  ICD-9-CM: 245.2     PCOS (polycystic ovarian syndrome)     ICD-10-CM: E28.2  ICD-9-CM: 256.4     Vitamin D deficiency     ICD-10-CM: E55.9  ICD-9-CM: 268.9             Plan  -Synthroid 212 mcg. She is currently taking 200 mcg + 25 mcg every other day .   She feels sick every time she reduces the dose to 200 mcg       -Vit D deficiency - cont Replesta - 14 000 IU twice weekly.      -Prediabetes - A1C was 6 on last visit.   Labs ordered today. Encouraged low carb diet   Repeat thyroid levels and adjust the dose.     Follow-up in 6 months.  "

## 2024-11-07 LAB
T4 FREE SERPL-MCNC: 1.65 NG/DL (ref 0.92–1.68)
TSH SERPL DL<=0.05 MIU/L-ACNC: 1.74 UIU/ML (ref 0.27–4.2)

## 2024-11-09 DIAGNOSIS — E06.3 HYPOTHYROIDISM DUE TO HASHIMOTO'S THYROIDITIS: Primary | ICD-10-CM

## 2024-12-09 ENCOUNTER — OFFICE VISIT (OUTPATIENT)
Dept: INTERNAL MEDICINE | Facility: CLINIC | Age: 49
End: 2024-12-09
Payer: COMMERCIAL

## 2024-12-09 ENCOUNTER — LAB (OUTPATIENT)
Dept: LAB | Facility: HOSPITAL | Age: 49
End: 2024-12-09
Payer: COMMERCIAL

## 2024-12-09 VITALS
HEIGHT: 63 IN | HEART RATE: 74 BPM | OXYGEN SATURATION: 99 % | DIASTOLIC BLOOD PRESSURE: 84 MMHG | BODY MASS INDEX: 51.91 KG/M2 | SYSTOLIC BLOOD PRESSURE: 124 MMHG | WEIGHT: 293 LBS

## 2024-12-09 DIAGNOSIS — E28.2 PCOS (POLYCYSTIC OVARIAN SYNDROME): ICD-10-CM

## 2024-12-09 DIAGNOSIS — E78.5 HYPERLIPIDEMIA, UNSPECIFIED HYPERLIPIDEMIA TYPE: ICD-10-CM

## 2024-12-09 DIAGNOSIS — Z91.018 HISTORY OF FOOD ANAPHYLAXIS: ICD-10-CM

## 2024-12-09 DIAGNOSIS — Z00.00 ANNUAL PHYSICAL EXAM: Primary | ICD-10-CM

## 2024-12-09 DIAGNOSIS — R73.03 PREDIABETES: ICD-10-CM

## 2024-12-09 DIAGNOSIS — E06.3 HYPOTHYROIDISM DUE TO HASHIMOTO'S THYROIDITIS: ICD-10-CM

## 2024-12-09 DIAGNOSIS — N17.9 AKI (ACUTE KIDNEY INJURY): ICD-10-CM

## 2024-12-09 DIAGNOSIS — Z15.89 MTHFR GENE MUTATION: ICD-10-CM

## 2024-12-09 DIAGNOSIS — E55.9 VITAMIN D DEFICIENCY: ICD-10-CM

## 2024-12-09 LAB
ALBUMIN SERPL-MCNC: 4.1 G/DL (ref 3.5–5.2)
ALBUMIN/GLOB SERPL: 1.2 G/DL
ALP SERPL-CCNC: 55 U/L (ref 39–117)
ALT SERPL W P-5'-P-CCNC: 26 U/L (ref 1–33)
ANION GAP SERPL CALCULATED.3IONS-SCNC: 11 MMOL/L (ref 5–15)
AST SERPL-CCNC: 22 U/L (ref 1–32)
BILIRUB SERPL-MCNC: 0.3 MG/DL (ref 0–1.2)
BUN SERPL-MCNC: 16 MG/DL (ref 6–20)
BUN/CREAT SERPL: 18.2 (ref 7–25)
CALCIUM SPEC-SCNC: 9.5 MG/DL (ref 8.6–10.5)
CHLORIDE SERPL-SCNC: 102 MMOL/L (ref 98–107)
CO2 SERPL-SCNC: 26 MMOL/L (ref 22–29)
CREAT SERPL-MCNC: 0.88 MG/DL (ref 0.57–1)
EGFRCR SERPLBLD CKD-EPI 2021: 79.7 ML/MIN/1.73
GLOBULIN UR ELPH-MCNC: 3.5 GM/DL
GLUCOSE SERPL-MCNC: 115 MG/DL (ref 65–99)
POTASSIUM SERPL-SCNC: 4.1 MMOL/L (ref 3.5–5.2)
PROT SERPL-MCNC: 7.6 G/DL (ref 6–8.5)
SODIUM SERPL-SCNC: 139 MMOL/L (ref 136–145)

## 2024-12-09 PROCEDURE — 80053 COMPREHEN METABOLIC PANEL: CPT

## 2024-12-09 PROCEDURE — 99396 PREV VISIT EST AGE 40-64: CPT | Performed by: INTERNAL MEDICINE

## 2024-12-09 RX ORDER — EPINEPHRINE 0.3 MG/.3ML
INJECTION SUBCUTANEOUS
Qty: 2 EACH | Refills: 0 | Status: SHIPPED | OUTPATIENT
Start: 2024-12-09

## 2024-12-09 NOTE — PROGRESS NOTES
Internal Medicine Annual Exam  Kanwal Bruner is a 49 y.o. female who presents today for an annual exam and with concerns as outlined below.    Chief Complaint  Chief Complaint   Patient presents with    Annual Exam        HPI  Ms. Bruner comes in today for her physical. She is doing well. Notes that she has stopped several supplements including B vitamin and zinc due to labs last month with KIKI. She does need to recheck labs today. Declines to add on lipid panel due to cost. Does not want labs ordered for outside lab either. She denies urinary sxs. Did have flank pain she attributed to back issue and sciatica. This has now resolved. She is up to date with colon cancer screening. Cologuard last year was negative. She is also up to date with mammogram. She declines COVID and flu vaccines today. Considering shingrix but will get at the pharmacy. Denies use of tobacco, ecigarettes, illicit drugs, and alcohol. She does need epipen refilled. Has hx of anaphylactic food allergy. Notes new allergy to vinegar with palpitations.           Review of Systems  Review of Systems   Constitutional: Negative.    Respiratory: Negative.     Cardiovascular: Negative.    Gastrointestinal: Negative.    Genitourinary: Negative.    Musculoskeletal:  Positive for back pain.   Skin: Negative.    Allergic/Immunologic: Positive for food allergies.   Neurological: Negative.    Hematological:  Negative for adenopathy.   Psychiatric/Behavioral: Negative.          Past Medical History  Past Medical History:   Diagnosis Date    Abnormal liver function test 08/24/2016    Acute bronchitis 10/11/2016    Bell's palsy     GERD (gastroesophageal reflux disease)     Hashimoto's disease     History of gallstones     History of ovarian cyst     Lactose intolerance     Microscopic hematuria 11/08/2017    Temporary high blood pressure 11/08/2017    Thyroid disease         Surgical History  Past Surgical History:   Procedure Laterality Date     ADENOIDECTOMY      BREAST BIOPSY Right 2013    Ultrasound guided     SECTION      2009 & 2005    CHOLECYSTECTOMY  1998    HYSTERECTOMY  2015    OOPHORECTOMY Right 2015    TYMPANOSTOMY TUBE PLACEMENT          Family History  Family History   Problem Relation Age of Onset    Migraines Mother     Hypertension Mother     Atrial fibrillation Mother     Hyperlipidemia Father     Obesity Father     Prostate cancer Father 42        passed at 47     Hypertension Father     Breast cancer Maternal Aunt 55        Passed away , BRCA testing negative    Hypothyroidism Maternal Aunt         Hashimotos    Hypothyroidism Maternal Aunt     Clotting disorder Maternal Aunt     Lung cancer Maternal Aunt     Lung cancer Maternal Uncle     Heart attack Maternal Grandmother     Migraines Maternal Grandmother     Stroke Paternal Grandfather     Thyroid disease Cousin     Hypertension Half-Sister     Diabetes Half-Sister     Ovarian cancer Neg Hx         Social History  Social History     Socioeconomic History    Marital status:    Tobacco Use    Smoking status: Never    Smokeless tobacco: Never   Vaping Use    Vaping status: Never Used   Substance and Sexual Activity    Alcohol use: No    Drug use: Defer    Sexual activity: Defer        Current Medications  Current Outpatient Medications on File Prior to Visit   Medication Sig Dispense Refill    Apple Cider Vinegar 300 MG tablet Take  by mouth.      Ascorbic Acid Buffered (Buffered Vitamin C) 1000 MG capsule       BERBERINE CHLORIDE PO Take 600 mg by mouth.      Cholecalciferol (Replesta) 1.25 MG (11235 UT) wafer Take 1 wafer by mouth 1 (One) Time Per Week. 4 wafer 11    EPINEPHrine (EPIPEN) 0.3 MG/0.3ML solution auto-injector injection Inject 0.3 ML into the shoulder, thigh, or buttocks for 1 dose. 2 each 11    levocetirizine (XYZAL) 5 MG tablet Take 1 tablet by mouth Daily.      Synthroid 200 MCG tablet Take 1 tablet by mouth Daily. 90 tablet 3     "Synthroid 25 MCG tablet Take 1 tablet by mouth Daily. Take along with 200 mcg 90 tablet 3    Thiamine HCl (vitamin B-1) 50 MG tablet Take 1 tablet by mouth Daily.      Zinc 50 MG capsule        No current facility-administered medications on file prior to visit.       Allergies  Allergies   Allergen Reactions    Adhesive Tape Anaphylaxis and Rash     Allergic to some adhesives but not others. Has had rash to some, anaphylaxis to adhesive used with eyelash extension.    Peanuts [Peanut Oil] Anaphylaxis    Metformin Other (See Comments)     Severe flushing with tachycardia      Ozempic (0.25 Or 0.5 Mg-Dose) [Semaglutide(0.25 Or 0.5mg-Dos)] GI Intolerance    Sulfa Antibiotics Rash        Objective  Visit Vitals  /84   Pulse 74   Ht 160 cm (62.99\")   Wt (!) 148 kg (326 lb)   SpO2 99%   BMI 57.76 kg/m²        Physical Exam  Physical Exam  Vitals and nursing note reviewed.   Constitutional:       General: She is not in acute distress.     Appearance: She is well-developed. She is obese. She is not ill-appearing, toxic-appearing or diaphoretic.   HENT:      Head: Normocephalic and atraumatic.      Right Ear: External ear normal.      Left Ear: External ear normal.      Nose: Nose normal.   Eyes:      General: No scleral icterus.     Conjunctiva/sclera: Conjunctivae normal.   Cardiovascular:      Rate and Rhythm: Normal rate and regular rhythm.      Heart sounds: Normal heart sounds. No murmur heard.  Pulmonary:      Effort: Pulmonary effort is normal. No respiratory distress.      Breath sounds: Normal breath sounds.   Abdominal:      General: There is no distension.      Palpations: Abdomen is soft. There is no mass.      Tenderness: There is no abdominal tenderness. There is no right CVA tenderness or left CVA tenderness.   Musculoskeletal:         General: No deformity.      Cervical back: Neck supple.      Right lower leg: No edema.      Left lower leg: No edema.   Lymphadenopathy:      Cervical: No cervical " adenopathy.   Skin:     General: Skin is warm and dry.      Findings: No rash.   Neurological:      Mental Status: She is alert and oriented to person, place, and time. Mental status is at baseline.      Gait: Gait normal.   Psychiatric:         Mood and Affect: Mood normal.         Behavior: Behavior normal.         Thought Content: Thought content normal.         Judgment: Judgment normal.          Results  Results for orders placed or performed in visit on 11/06/24   T4, Free    Collection Time: 11/06/24 11:53 AM    Specimen: Blood   Result Value Ref Range    Free T4 1.65 0.92 - 1.68 ng/dL   TSH    Collection Time: 11/06/24 11:53 AM    Specimen: Blood   Result Value Ref Range    TSH 1.740 0.270 - 4.200 uIU/mL   Comprehensive Metabolic Panel    Collection Time: 11/06/24 11:53 AM    Specimen: Blood   Result Value Ref Range    Glucose 91 65 - 99 mg/dL    BUN 17 6 - 20 mg/dL    Creatinine 1.29 (H) 0.57 - 1.00 mg/dL    Sodium 142 136 - 145 mmol/L    Potassium 4.2 3.5 - 5.2 mmol/L    Chloride 106 98 - 107 mmol/L    CO2 26.9 22.0 - 29.0 mmol/L    Calcium 9.5 8.6 - 10.5 mg/dL    Total Protein 7.6 6.0 - 8.5 g/dL    Albumin 3.9 3.5 - 5.2 g/dL    ALT (SGPT) 34 (H) 1 - 33 U/L    AST (SGOT) 23 1 - 32 U/L    Alkaline Phosphatase 57 39 - 117 U/L    Total Bilirubin 0.2 0.0 - 1.2 mg/dL    Globulin 3.7 gm/dL    A/G Ratio 1.1 g/dL    BUN/Creatinine Ratio 13.2 7.0 - 25.0    Anion Gap 9.1 5.0 - 15.0 mmol/L    eGFR 50.4 (L) >60.0 mL/min/1.73   Vitamin D,25-Hydroxy    Collection Time: 11/06/24 11:53 AM    Specimen: Arm, Left; Blood   Result Value Ref Range    25 Hydroxy, Vitamin D 43.5 30.0 - 100.0 ng/ml   Hemoglobin A1c    Collection Time: 11/06/24 11:53 AM    Specimen: Blood   Result Value Ref Range    Hemoglobin A1C 5.90 (H) 4.80 - 5.60 %        Assessment and Plan  Diagnoses and all orders for this visit:    Annual physical exam  - Counseling was given to patient for the following topics:  importance of self breast exam and breast  health and importance of immunizations, including risks and benefits. Also discussed the importance of regular dental and vision care.    Hypothyroidism due to Hashimoto's thyroiditis  - Follows with Dr. Gonzalez  - on synthroid 212.5mcg daily     PCOS (polycystic ovarian syndrome)  - Clinically has several metabolic features of PCOS including insulin resistance, prior HLD, obesity  - s/p hysterectomy so no longer having menstrual irregularity  - Intolerant of metformin in the past     Vitamin D deficiency  - Managed by Dr. 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 Baseline prior creatinine 0.7-0.8.  - has held several supplements  - recheck today    Health Maintenance   Topic Date Due    HEPATITIS C SCREENING  Never done    ZOSTER VACCINE (1 of 2) Never done    LIPID PANEL  03/30/2024    INFLUENZA VACCINE  07/01/2024    COVID-19 Vaccine (2 - 2024-25 season) 09/01/2024    ANNUAL PHYSICAL  12/05/2024    BMI FOLLOWUP  12/09/2025    MAMMOGRAM  04/18/2026    COLORECTAL CANCER SCREENING  12/20/2026    TDAP/TD VACCINES (2 - Td or Tdap) 06/24/2032    Pneumococcal Vaccine 0-64  Aged Out    PAP SMEAR  Discontinued     Health Maintenance  - Pap smear: s/p hysterectomy, benign pathology.  - Mammogram: 4/2024 BIRADS 1.  - Colonoscopy: cologuard 12/2023 negative  - Immunizations: Tdap 6/2024. Hep A x2. COVID, flu, shingrix declined.  - Depression screening: negative 12/2024.       Return in about 1 year (around 12/9/2025) for  Annual, Labs today.

## 2025-07-21 ENCOUNTER — PATIENT MESSAGE (OUTPATIENT)
Dept: INTERNAL MEDICINE | Facility: CLINIC | Age: 50
End: 2025-07-21
Payer: COMMERCIAL

## 2025-07-21 DIAGNOSIS — B07.9 VIRAL WARTS, UNSPECIFIED TYPE: Primary | ICD-10-CM

## 2025-07-22 RX ORDER — IMIQUIMOD 12.5 MG/.25G
1 CREAM TOPICAL 3 TIMES WEEKLY
Qty: 12 EACH | Refills: 2 | Status: SHIPPED | OUTPATIENT
Start: 2025-07-23